# Patient Record
Sex: FEMALE | Race: WHITE | NOT HISPANIC OR LATINO | Employment: OTHER | URBAN - METROPOLITAN AREA
[De-identification: names, ages, dates, MRNs, and addresses within clinical notes are randomized per-mention and may not be internally consistent; named-entity substitution may affect disease eponyms.]

---

## 2018-10-18 ENCOUNTER — HOSPITAL ENCOUNTER (EMERGENCY)
Facility: HOSPITAL | Age: 78
Discharge: HOME | End: 2018-10-18
Attending: EMERGENCY MEDICINE
Payer: MEDICARE

## 2018-10-18 VITALS
SYSTOLIC BLOOD PRESSURE: 186 MMHG | BODY MASS INDEX: 17.13 KG/M2 | HEART RATE: 90 BPM | HEIGHT: 64 IN | WEIGHT: 100.31 LBS | DIASTOLIC BLOOD PRESSURE: 87 MMHG | TEMPERATURE: 99 F | RESPIRATION RATE: 20 BRPM | OXYGEN SATURATION: 100 %

## 2018-10-18 DIAGNOSIS — L23.9 ALLERGIC CONTACT DERMATITIS, UNSPECIFIED TRIGGER: Primary | ICD-10-CM

## 2018-10-18 DIAGNOSIS — Z72.0 TOBACCO USE: ICD-10-CM

## 2018-10-18 DIAGNOSIS — R03.0 ELEVATED BLOOD PRESSURE READING: ICD-10-CM

## 2018-10-18 PROCEDURE — 99283 EMERGENCY DEPT VISIT LOW MDM: CPT

## 2018-10-18 PROCEDURE — 25000003 PHARM REV CODE 250: Performed by: PHYSICIAN ASSISTANT

## 2018-10-18 RX ORDER — DIPHENHYDRAMINE HCL 25 MG
25 CAPSULE ORAL
Status: COMPLETED | OUTPATIENT
Start: 2018-10-18 | End: 2018-10-18

## 2018-10-18 RX ORDER — FAMOTIDINE 20 MG/1
20 TABLET, FILM COATED ORAL
Status: COMPLETED | OUTPATIENT
Start: 2018-10-18 | End: 2018-10-18

## 2018-10-18 RX ORDER — HYDROXYZINE HYDROCHLORIDE 50 MG/1
50 TABLET, FILM COATED ORAL 3 TIMES DAILY PRN
Qty: 15 TABLET | Refills: 0 | Status: SHIPPED | OUTPATIENT
Start: 2018-10-18

## 2018-10-18 RX ADMIN — DIPHENHYDRAMINE HYDROCHLORIDE 25 MG: 25 CAPSULE ORAL at 05:10

## 2018-10-18 RX ADMIN — FAMOTIDINE 20 MG: 20 TABLET ORAL at 05:10

## 2018-10-18 NOTE — ED NOTES
Left cheek with redness, itching since yest and today swelling to left cheek and redness extending to neck area. Aaox3, skin warm and dry, resp unlabored and even. amb with steady gait and burnett well.

## 2018-10-18 NOTE — ED PROVIDER NOTES
Encounter Date: 10/18/2018       History     Chief Complaint   Patient presents with    Rash     left cheek started with itching yest and today swollen  and red and redness extending to neck     The patient presents to the ER for an emergent evaluation due to a suspected allergic reaction. She states that she is having redness, swelling, and itching to her face and neck. She states that the symptoms began within the past 24 hours. She states that the degree is moderate. She states that the course is constant. She states that she has uncontrolled itching to the areas. She states that it may be related to her makeup. She denies any swelling of her face, lips, tongue, or throat. She has tried using Gold bond cream and alcohol wipes, but denies any improvement.           Review of patient's allergies indicates:  No Known Allergies  History reviewed. No pertinent past medical history.  History reviewed. No pertinent surgical history.  History reviewed. No pertinent family history.  Social History     Tobacco Use    Smoking status: Current Every Day Smoker     Packs/day: 1.00     Types: Cigarettes    Smokeless tobacco: Never Used   Substance Use Topics    Alcohol use: Yes     Comment: socially    Drug use: No     Review of Systems   Constitutional: Negative for activity change, appetite change, chills, diaphoresis, fatigue and fever.   HENT: Positive for facial swelling. Negative for sore throat, trouble swallowing and voice change.    Eyes: Negative for discharge, redness, itching and visual disturbance.   Respiratory: Negative for cough, chest tightness, shortness of breath, wheezing and stridor.    Cardiovascular: Negative for chest pain.   Gastrointestinal: Negative for abdominal pain, diarrhea, nausea and vomiting.   Genitourinary: Negative for decreased urine volume and dysuria.   Musculoskeletal: Negative for arthralgias and myalgias.   Neurological: Negative for dizziness, syncope, facial asymmetry, weakness,  light-headedness, numbness and headaches.   Hematological: Negative for adenopathy.   Psychiatric/Behavioral: Negative for confusion. The patient is nervous/anxious.        Physical Exam     Initial Vitals [10/18/18 1709]   BP Pulse Resp Temp SpO2   (!) 180/82 (!) 118 20 99.3 °F (37.4 °C) 100 %      MAP       --         Physical Exam    Nursing note and vitals reviewed.  Constitutional: She appears well-developed and well-nourished. She is not diaphoretic.   Alert and ambulatory. Accompanied by her daughter. Scratching her neck and face throughout entire interview and exam.    HENT:   Head: Normocephalic.   Mouth/Throat: Oropharynx is clear and moist.   No swelling of lips, tongue, or throat. No angioedema. No hoarseness or muffled voice. Patent airway. No adenopathy.    Eyes: Conjunctivae are normal. Right eye exhibits no discharge. Left eye exhibits no discharge.   Neck: Neck supple.   Cardiovascular: Intact distal pulses.   Pulmonary/Chest: Breath sounds normal. No respiratory distress. She has no wheezes.   Abdominal: Soft. There is no tenderness.   Musculoskeletal: Normal range of motion. She exhibits no edema.   Neurological: She is alert and oriented to person, place, and time. She has normal strength. No cranial nerve deficit.   Skin: Skin is warm and dry. Rash noted.   Psychiatric:   Anxious.          ED Course   Procedures  Labs Reviewed - No data to display       Imaging Results    None          Medical Decision Making:   Initial Assessment:   Redness and itching to face and neck after application of makeup.   ED Management:  Advised to remove all makeup or cosmetics from skin using soap and water and avoiding any further use until symptoms resolve.   Benadryl and Pepcid given in the ER   Rx for Hydroxyzine given to take at home.   Informed the patient that her blood pressure reading was significantly elevated during her ER visit today and advised her to follow up closely with primary care to be properly  evaluated for possible HTN or pre-HTN.   Advised smoking cessation   Advised prompt return to the ER if unimproved or if worse in any way.   Discussed use of steroids and pt elected to hold off on steroid therapy for now, will return if not improving.     Additional MDM:   Smoking Cessation: The patient is a smoker. The patient was counseled on smoking cessation for: 4 minutes. The patient was counseled on tobacco related  health complications. Appropriate patient literature was given to the patient concerning tobacco cessation.                    Clinical Impression:   The primary encounter diagnosis was Allergic contact dermatitis, unspecified trigger. Diagnoses of Elevated blood pressure reading and Tobacco use were also pertinent to this visit.      Disposition:   Disposition: Discharged  Condition: Stable                        Terry Escalante PA-C  10/18/18 0224

## 2022-05-28 ENCOUNTER — ANESTHESIA EVENT (OUTPATIENT)
Dept: ENDOSCOPY | Facility: HOSPITAL | Age: 82
DRG: 435 | End: 2022-05-28
Payer: MEDICARE

## 2022-05-28 ENCOUNTER — HOSPITAL ENCOUNTER (INPATIENT)
Facility: HOSPITAL | Age: 82
LOS: 1 days | Discharge: HOME | DRG: 435 | End: 2022-05-30
Attending: EMERGENCY MEDICINE | Admitting: INTERNAL MEDICINE
Payer: MEDICARE

## 2022-05-28 DIAGNOSIS — K86.89 PANCREATIC MASS: Primary | ICD-10-CM

## 2022-05-28 DIAGNOSIS — Z72.0 TOBACCO ABUSE: ICD-10-CM

## 2022-05-28 DIAGNOSIS — R17 JAUNDICE: ICD-10-CM

## 2022-05-28 DIAGNOSIS — R07.9 CHEST PAIN: ICD-10-CM

## 2022-05-28 DIAGNOSIS — R53.1 WEAKNESS: ICD-10-CM

## 2022-05-28 DIAGNOSIS — R63.4 WEIGHT LOSS: ICD-10-CM

## 2022-05-28 DIAGNOSIS — R19.00 ABDOMINAL MASS, UNSPECIFIED ABDOMINAL LOCATION: ICD-10-CM

## 2022-05-28 PROBLEM — R93.89 ABNORMAL CXR: Status: ACTIVE | Noted: 2022-05-28

## 2022-05-28 PROBLEM — F17.200 TOBACCO USE DISORDER: Status: ACTIVE | Noted: 2022-05-28

## 2022-05-28 PROBLEM — K83.1 OBSTRUCTION OF BILE DUCT: Status: ACTIVE | Noted: 2022-05-28

## 2022-05-28 PROBLEM — I10 HTN (HYPERTENSION): Status: ACTIVE | Noted: 2022-05-28

## 2022-05-28 PROBLEM — R74.8 ELEVATED LIVER ENZYMES: Status: ACTIVE | Noted: 2022-05-28

## 2022-05-28 LAB
ALBUMIN SERPL BCP-MCNC: 2.7 G/DL (ref 3.5–5.2)
ALP SERPL-CCNC: 1705 U/L (ref 55–135)
ALT SERPL W/O P-5'-P-CCNC: 396 U/L (ref 10–44)
AMMONIA PLAS-SCNC: 26 UMOL/L (ref 10–50)
AMYLASE SERPL-CCNC: 213 U/L (ref 20–110)
ANION GAP SERPL CALC-SCNC: 13 MMOL/L (ref 8–16)
APTT BLDCRRT: 27.3 SEC (ref 21–32)
AST SERPL-CCNC: 333 U/L (ref 10–40)
BACTERIA #/AREA URNS AUTO: NORMAL /HPF
BASOPHILS # BLD AUTO: 0.05 K/UL (ref 0–0.2)
BASOPHILS NFR BLD: 0.6 % (ref 0–1.9)
BILIRUB DIRECT SERPL-MCNC: >14 MG/DL (ref 0.1–0.3)
BILIRUB SERPL-MCNC: 23.2 MG/DL (ref 0.1–1)
BILIRUB UR QL STRIP: ABNORMAL
BUN SERPL-MCNC: 13 MG/DL (ref 8–23)
CALCIUM SERPL-MCNC: 9.7 MG/DL (ref 8.7–10.5)
CHLORIDE SERPL-SCNC: 97 MMOL/L (ref 95–110)
CLARITY UR REFRACT.AUTO: ABNORMAL
CO2 SERPL-SCNC: 24 MMOL/L (ref 23–29)
COLOR UR AUTO: ABNORMAL
CREAT SERPL-MCNC: 0.6 MG/DL (ref 0.5–1.4)
CTP QC/QA: YES
DIFFERENTIAL METHOD: ABNORMAL
EOSINOPHIL # BLD AUTO: 0.1 K/UL (ref 0–0.5)
EOSINOPHIL NFR BLD: 0.9 % (ref 0–8)
ERYTHROCYTE [DISTWIDTH] IN BLOOD BY AUTOMATED COUNT: 17 % (ref 11.5–14.5)
EST. GFR  (AFRICAN AMERICAN): >60 ML/MIN/1.73 M^2
EST. GFR  (NON AFRICAN AMERICAN): >60 ML/MIN/1.73 M^2
ETHANOL SERPL-MCNC: <10 MG/DL
GLUCOSE SERPL-MCNC: 99 MG/DL (ref 70–110)
GLUCOSE UR QL STRIP: NEGATIVE
HCT VFR BLD AUTO: 36.2 % (ref 37–48.5)
HGB BLD-MCNC: 12.9 G/DL (ref 12–16)
HGB UR QL STRIP: NEGATIVE
IMM GRANULOCYTES # BLD AUTO: 0.06 K/UL (ref 0–0.04)
IMM GRANULOCYTES NFR BLD AUTO: 0.7 % (ref 0–0.5)
INR PPP: 1.1 (ref 0.8–1.2)
INR PPP: 1.1 (ref 0.8–1.2)
KETONES UR QL STRIP: NEGATIVE
LEUKOCYTE ESTERASE UR QL STRIP: NEGATIVE
LIPASE SERPL-CCNC: 230 U/L (ref 4–60)
LIPASE SERPL-CCNC: 496 U/L (ref 4–60)
LYMPHOCYTES # BLD AUTO: 1.1 K/UL (ref 1–4.8)
LYMPHOCYTES NFR BLD: 12.9 % (ref 18–48)
MAGNESIUM SERPL-MCNC: 2 MG/DL (ref 1.6–2.6)
MCH RBC QN AUTO: 31.1 PG (ref 27–31)
MCHC RBC AUTO-ENTMCNC: 35.6 G/DL (ref 32–36)
MCV RBC AUTO: 87 FL (ref 82–98)
MICROSCOPIC COMMENT: NORMAL
MONOCYTES # BLD AUTO: 0.6 K/UL (ref 0.3–1)
MONOCYTES NFR BLD: 6.8 % (ref 4–15)
NEUTROPHILS # BLD AUTO: 6.7 K/UL (ref 1.8–7.7)
NEUTROPHILS NFR BLD: 78.1 % (ref 38–73)
NITRITE UR QL STRIP: NEGATIVE
NRBC BLD-RTO: 0 /100 WBC
PH UR STRIP: 6 [PH] (ref 5–8)
PLATELET # BLD AUTO: 349 K/UL (ref 150–450)
PMV BLD AUTO: 12.6 FL (ref 9.2–12.9)
POCT GLUCOSE: 100 MG/DL (ref 70–110)
POTASSIUM SERPL-SCNC: 4.1 MMOL/L (ref 3.5–5.1)
PROCALCITONIN SERPL IA-MCNC: 0.23 NG/ML
PROT SERPL-MCNC: 7.5 G/DL (ref 6–8.4)
PROT UR QL STRIP: NEGATIVE
PROTHROMBIN TIME: 11.3 SEC (ref 9–12.5)
PROTHROMBIN TIME: 11.5 SEC (ref 9–12.5)
RBC # BLD AUTO: 4.15 M/UL (ref 4–5.4)
RBC #/AREA URNS AUTO: 0 /HPF (ref 0–4)
SARS-COV-2 RDRP RESP QL NAA+PROBE: NEGATIVE
SODIUM SERPL-SCNC: 134 MMOL/L (ref 136–145)
SP GR UR STRIP: 1.02 (ref 1–1.03)
SQUAMOUS #/AREA URNS AUTO: 1 /HPF
URN SPEC COLLECT METH UR: ABNORMAL
UROBILINOGEN UR STRIP-ACNC: NEGATIVE EU/DL
WBC # BLD AUTO: 8.52 K/UL (ref 3.9–12.7)
WBC #/AREA URNS AUTO: 2 /HPF (ref 0–5)

## 2022-05-28 PROCEDURE — 85610 PROTHROMBIN TIME: CPT | Mod: 91 | Performed by: FAMILY MEDICINE

## 2022-05-28 PROCEDURE — 83735 ASSAY OF MAGNESIUM: CPT | Mod: ER | Performed by: EMERGENCY MEDICINE

## 2022-05-28 PROCEDURE — G0378 HOSPITAL OBSERVATION PER HR: HCPCS | Mod: ER

## 2022-05-28 PROCEDURE — 82962 GLUCOSE BLOOD TEST: CPT | Mod: ER

## 2022-05-28 PROCEDURE — 86301 IMMUNOASSAY TUMOR CA 19-9: CPT | Performed by: EMERGENCY MEDICINE

## 2022-05-28 PROCEDURE — 63600175 PHARM REV CODE 636 W HCPCS: Performed by: FAMILY MEDICINE

## 2022-05-28 PROCEDURE — A4216 STERILE WATER/SALINE, 10 ML: HCPCS | Performed by: FAMILY MEDICINE

## 2022-05-28 PROCEDURE — 83690 ASSAY OF LIPASE: CPT | Mod: 91 | Performed by: FAMILY MEDICINE

## 2022-05-28 PROCEDURE — 85025 COMPLETE CBC W/AUTO DIFF WBC: CPT | Mod: ER | Performed by: EMERGENCY MEDICINE

## 2022-05-28 PROCEDURE — U0002 COVID-19 LAB TEST NON-CDC: HCPCS | Mod: ER | Performed by: EMERGENCY MEDICINE

## 2022-05-28 PROCEDURE — 82150 ASSAY OF AMYLASE: CPT | Mod: ER | Performed by: EMERGENCY MEDICINE

## 2022-05-28 PROCEDURE — 80053 COMPREHEN METABOLIC PANEL: CPT | Mod: ER | Performed by: EMERGENCY MEDICINE

## 2022-05-28 PROCEDURE — 99285 EMERGENCY DEPT VISIT HI MDM: CPT | Mod: 25,ER

## 2022-05-28 PROCEDURE — 85610 PROTHROMBIN TIME: CPT | Mod: ER | Performed by: EMERGENCY MEDICINE

## 2022-05-28 PROCEDURE — 93005 ELECTROCARDIOGRAM TRACING: CPT | Mod: ER

## 2022-05-28 PROCEDURE — 82140 ASSAY OF AMMONIA: CPT | Mod: ER | Performed by: EMERGENCY MEDICINE

## 2022-05-28 PROCEDURE — 96372 THER/PROPH/DIAG INJ SC/IM: CPT | Performed by: FAMILY MEDICINE

## 2022-05-28 PROCEDURE — 93010 ELECTROCARDIOGRAM REPORT: CPT | Mod: ,,, | Performed by: INTERNAL MEDICINE

## 2022-05-28 PROCEDURE — G0378 HOSPITAL OBSERVATION PER HR: HCPCS

## 2022-05-28 PROCEDURE — 96361 HYDRATE IV INFUSION ADD-ON: CPT | Mod: ER

## 2022-05-28 PROCEDURE — 25000003 PHARM REV CODE 250: Performed by: FAMILY MEDICINE

## 2022-05-28 PROCEDURE — 82248 BILIRUBIN DIRECT: CPT | Performed by: FAMILY MEDICINE

## 2022-05-28 PROCEDURE — 36415 COLL VENOUS BLD VENIPUNCTURE: CPT | Performed by: FAMILY MEDICINE

## 2022-05-28 PROCEDURE — 81000 URINALYSIS NONAUTO W/SCOPE: CPT | Mod: ER | Performed by: EMERGENCY MEDICINE

## 2022-05-28 PROCEDURE — 96375 TX/PRO/DX INJ NEW DRUG ADDON: CPT | Performed by: EMERGENCY MEDICINE

## 2022-05-28 PROCEDURE — 87040 BLOOD CULTURE FOR BACTERIA: CPT | Mod: 59 | Performed by: FAMILY MEDICINE

## 2022-05-28 PROCEDURE — 83690 ASSAY OF LIPASE: CPT | Mod: ER | Performed by: EMERGENCY MEDICINE

## 2022-05-28 PROCEDURE — 84145 PROCALCITONIN (PCT): CPT | Performed by: FAMILY MEDICINE

## 2022-05-28 PROCEDURE — 63600175 PHARM REV CODE 636 W HCPCS: Mod: ER | Performed by: EMERGENCY MEDICINE

## 2022-05-28 PROCEDURE — 82077 ASSAY SPEC XCP UR&BREATH IA: CPT | Mod: ER | Performed by: EMERGENCY MEDICINE

## 2022-05-28 PROCEDURE — 93010 EKG 12-LEAD: ICD-10-PCS | Mod: ,,, | Performed by: INTERNAL MEDICINE

## 2022-05-28 PROCEDURE — 80074 ACUTE HEPATITIS PANEL: CPT | Performed by: EMERGENCY MEDICINE

## 2022-05-28 PROCEDURE — 85730 THROMBOPLASTIN TIME PARTIAL: CPT | Mod: ER | Performed by: EMERGENCY MEDICINE

## 2022-05-28 RX ORDER — SODIUM CHLORIDE 0.9 % (FLUSH) 0.9 %
10 SYRINGE (ML) INJECTION EVERY 8 HOURS
Status: DISCONTINUED | OUTPATIENT
Start: 2022-05-28 | End: 2022-05-30 | Stop reason: HOSPADM

## 2022-05-28 RX ORDER — KETOROLAC TROMETHAMINE 30 MG/ML
15 INJECTION, SOLUTION INTRAMUSCULAR; INTRAVENOUS ONCE AS NEEDED
Status: DISCONTINUED | OUTPATIENT
Start: 2022-05-28 | End: 2022-05-30 | Stop reason: HOSPADM

## 2022-05-28 RX ORDER — HYDROXYZINE HYDROCHLORIDE 25 MG/1
50 TABLET, FILM COATED ORAL 3 TIMES DAILY PRN
Status: DISCONTINUED | OUTPATIENT
Start: 2022-05-28 | End: 2022-05-30 | Stop reason: HOSPADM

## 2022-05-28 RX ORDER — POLYETHYLENE GLYCOL 3350 17 G/17G
17 POWDER, FOR SOLUTION ORAL DAILY
Status: DISCONTINUED | OUTPATIENT
Start: 2022-05-28 | End: 2022-05-30 | Stop reason: HOSPADM

## 2022-05-28 RX ORDER — TALC
6 POWDER (GRAM) TOPICAL NIGHTLY PRN
Status: DISCONTINUED | OUTPATIENT
Start: 2022-05-28 | End: 2022-05-30 | Stop reason: HOSPADM

## 2022-05-28 RX ORDER — SODIUM,POTASSIUM PHOSPHATES 280-250MG
2 POWDER IN PACKET (EA) ORAL
Status: DISCONTINUED | OUTPATIENT
Start: 2022-05-28 | End: 2022-05-30 | Stop reason: HOSPADM

## 2022-05-28 RX ORDER — IBUPROFEN 400 MG/1
400 TABLET ORAL EVERY 6 HOURS PRN
Status: DISCONTINUED | OUTPATIENT
Start: 2022-05-28 | End: 2022-05-30 | Stop reason: HOSPADM

## 2022-05-28 RX ORDER — GLUCAGON 1 MG
1 KIT INJECTION
Status: DISCONTINUED | OUTPATIENT
Start: 2022-05-28 | End: 2022-05-30 | Stop reason: HOSPADM

## 2022-05-28 RX ORDER — INSULIN ASPART 100 [IU]/ML
0-5 INJECTION, SOLUTION INTRAVENOUS; SUBCUTANEOUS
Status: DISCONTINUED | OUTPATIENT
Start: 2022-05-28 | End: 2022-05-30 | Stop reason: HOSPADM

## 2022-05-28 RX ORDER — METOPROLOL SUCCINATE 25 MG/1
TABLET, EXTENDED RELEASE ORAL
Status: ON HOLD | COMMUNITY
Start: 2021-08-26 | End: 2022-05-30 | Stop reason: HOSPADM

## 2022-05-28 RX ORDER — ACETAMINOPHEN 325 MG/1
650 TABLET ORAL EVERY 4 HOURS PRN
Status: DISCONTINUED | OUTPATIENT
Start: 2022-05-28 | End: 2022-05-28

## 2022-05-28 RX ORDER — HYDRALAZINE HYDROCHLORIDE 20 MG/ML
10 INJECTION INTRAMUSCULAR; INTRAVENOUS EVERY 8 HOURS PRN
Status: DISCONTINUED | OUTPATIENT
Start: 2022-05-28 | End: 2022-05-30 | Stop reason: HOSPADM

## 2022-05-28 RX ORDER — NALOXONE HCL 0.4 MG/ML
0.02 VIAL (ML) INJECTION
Status: DISCONTINUED | OUTPATIENT
Start: 2022-05-28 | End: 2022-05-30 | Stop reason: HOSPADM

## 2022-05-28 RX ORDER — LANOLIN ALCOHOL/MO/W.PET/CERES
800 CREAM (GRAM) TOPICAL
Status: DISCONTINUED | OUTPATIENT
Start: 2022-05-28 | End: 2022-05-30 | Stop reason: HOSPADM

## 2022-05-28 RX ORDER — NICOTINE 7MG/24HR
1 PATCH, TRANSDERMAL 24 HOURS TRANSDERMAL DAILY
Status: DISCONTINUED | OUTPATIENT
Start: 2022-05-29 | End: 2022-05-30 | Stop reason: HOSPADM

## 2022-05-28 RX ORDER — ACETAMINOPHEN 325 MG/1
650 TABLET ORAL EVERY 4 HOURS PRN
Status: DISCONTINUED | OUTPATIENT
Start: 2022-05-28 | End: 2022-05-30 | Stop reason: HOSPADM

## 2022-05-28 RX ORDER — LABETALOL HYDROCHLORIDE 5 MG/ML
10 INJECTION, SOLUTION INTRAVENOUS ONCE
Status: COMPLETED | OUTPATIENT
Start: 2022-05-29 | End: 2022-05-29

## 2022-05-28 RX ORDER — HEPARIN SODIUM 5000 [USP'U]/ML
5000 INJECTION, SOLUTION INTRAVENOUS; SUBCUTANEOUS EVERY 8 HOURS
Status: DISCONTINUED | OUTPATIENT
Start: 2022-05-28 | End: 2022-05-30 | Stop reason: HOSPADM

## 2022-05-28 RX ORDER — SODIUM CHLORIDE 0.9 % (FLUSH) 0.9 %
10 SYRINGE (ML) INJECTION
Status: DISCONTINUED | OUTPATIENT
Start: 2022-05-28 | End: 2022-05-30 | Stop reason: HOSPADM

## 2022-05-28 RX ORDER — IBUPROFEN 200 MG
16 TABLET ORAL
Status: DISCONTINUED | OUTPATIENT
Start: 2022-05-28 | End: 2022-05-30 | Stop reason: HOSPADM

## 2022-05-28 RX ORDER — IBUPROFEN 200 MG
24 TABLET ORAL
Status: DISCONTINUED | OUTPATIENT
Start: 2022-05-28 | End: 2022-05-30 | Stop reason: HOSPADM

## 2022-05-28 RX ORDER — SODIUM CHLORIDE, SODIUM LACTATE, POTASSIUM CHLORIDE, CALCIUM CHLORIDE 600; 310; 30; 20 MG/100ML; MG/100ML; MG/100ML; MG/100ML
1000 INJECTION, SOLUTION INTRAVENOUS
Status: COMPLETED | OUTPATIENT
Start: 2022-05-28 | End: 2022-05-28

## 2022-05-28 RX ADMIN — POLYETHYLENE GLYCOL 3350 17 G: 17 POWDER, FOR SOLUTION ORAL at 04:05

## 2022-05-28 RX ADMIN — Medication 10 ML: at 09:05

## 2022-05-28 RX ADMIN — SODIUM CHLORIDE, SODIUM LACTATE, POTASSIUM CHLORIDE, AND CALCIUM CHLORIDE 1000 ML: .6; .31; .03; .02 INJECTION, SOLUTION INTRAVENOUS at 10:05

## 2022-05-28 RX ADMIN — HYDRALAZINE HYDROCHLORIDE 10 MG: 20 INJECTION, SOLUTION INTRAMUSCULAR; INTRAVENOUS at 04:05

## 2022-05-28 RX ADMIN — HEPARIN SODIUM 5000 UNITS: 5000 INJECTION INTRAVENOUS; SUBCUTANEOUS at 09:05

## 2022-05-28 NOTE — ASSESSMENT & PLAN NOTE
Concerns for mass involving pancreatic head  Gastroenterology consulted  Npo after mn for eus and ercp  Ca19-9

## 2022-05-28 NOTE — ASSESSMENT & PLAN NOTE
Increased density right lung base  Ct chest with emphysema but no suspicious masses   Tobacco use disorder

## 2022-05-28 NOTE — PLAN OF CARE
[facility]     GI was consulted for new onset jaundice. Patient is at [facility] ED. Labs notable for a TB: 23.2, alk phos of 1705, , . CTAP revealed marked dilation of bile ducts and pancreatic ducts.     Case was discussed with ED physician and Dr Christy.   Plan for ERCP and EUS tomorrow. NPO after MN. Hold DVT prophylaxis.  ordered.

## 2022-05-28 NOTE — ASSESSMENT & PLAN NOTE
Intrahepatic and extrahepatic ductal dilatation on imaging  No focal liver masses  eus and ercp in am per gastroenterology   Avoid liver toxins  etoh serum within normal limits

## 2022-05-28 NOTE — ED PROVIDER NOTES
Encounter Date: 5/28/2022       History     Chief Complaint   Patient presents with    Jaundice     Onset 1 week. Denies liver problems. Jaundice skin and eyes. +itchy skin       Here with her daughter who is a nurse.  She has had relatively little medical history and relatively infrequent medical contact, last known checkup or labs were about a year ago.  She does have high blood pressure for which she takes metoprolol as her only medication.  She drinks on occasion, no reported history of heavy alcohol use.  Smokes a pack a day.  Symptoms now include weight loss of about 10% of her previous body weight over an uncertain period of time, decreased appetite, increase in chronic skin problems in the form of itching and now noted by patient and family to be jaundiced and icteric for about a week.  When further questioned about alcohol use, it is unclear but it appears that she drinks 2 or more drinks a day.  Denies chest pain, fever, abdominal pain, any sign of bleeding, dyspnea, urinary complaints, or any other problems.  No previous known history of liver disease or any other condition or surgery besides cataract excision.  She appears to have delayed seeking medical care due to a general discomfort with healthcare issues.    The history is provided by the patient and a relative. No  was used.     Review of patient's allergies indicates:  No Known Allergies  Past Medical History:   Diagnosis Date    Hypertension      Past Surgical History:   Procedure Laterality Date    CATARACT EXTRACTION  2021     History reviewed. No pertinent family history.  Social History     Tobacco Use    Smoking status: Current Every Day Smoker     Packs/day: 1.00     Types: Cigarettes    Smokeless tobacco: Never Used   Substance Use Topics    Alcohol use: Yes     Alcohol/week: 14.0 standard drinks     Types: 14 Cans of beer per week     Comment: socially    Drug use: No     Review of Systems   Constitutional:  Positive for appetite change and unexpected weight change. Negative for activity change, fatigue and fever.   HENT: Negative for congestion, ear pain, facial swelling, nosebleeds, sinus pressure and sore throat.    Eyes: Negative for pain, discharge, redness and visual disturbance.   Respiratory: Negative for cough, choking, chest tightness, shortness of breath and wheezing.    Cardiovascular: Negative for chest pain, palpitations and leg swelling.   Gastrointestinal: Negative for abdominal distention, abdominal pain, nausea and vomiting.   Endocrine: Negative for heat intolerance, polydipsia and polyuria.   Genitourinary: Negative for difficulty urinating, dysuria, flank pain, hematuria and urgency.   Musculoskeletal: Negative for back pain, gait problem, joint swelling and myalgias.   Skin: Positive for color change. Negative for rash.   Allergic/Immunologic: Negative for environmental allergies and food allergies.   Neurological: Negative for dizziness, weakness, numbness and headaches.   Hematological: Negative for adenopathy. Does not bruise/bleed easily.   Psychiatric/Behavioral: Negative for agitation and behavioral problems. The patient is not nervous/anxious.    All other systems reviewed and are negative.      Physical Exam     Initial Vitals [05/28/22 0705]   BP Pulse Resp Temp SpO2   (!) 200/116 101 18 97.7 °F (36.5 °C) 97 %      MAP       --         Physical Exam    Nursing note and vitals reviewed.  Constitutional: She appears well-developed. She is not diaphoretic. No distress.   Underweight; general muscular wasting; NAD   HENT:   Head: Normocephalic and atraumatic.   Mouth/Throat: No oropharyngeal exudate.   Eyes: Conjunctivae and EOM are normal. Pupils are equal, round, and reactive to light. Right eye exhibits no discharge. Left eye exhibits no discharge. Scleral icterus is present.   Neck: Neck supple. No thyromegaly present. No tracheal deviation present. No JVD present.   Normal range of  motion.  Cardiovascular: Normal rate, regular rhythm, normal heart sounds and intact distal pulses. Exam reveals no gallop and no friction rub.    No murmur heard.  Pulmonary/Chest: Breath sounds normal. No stridor. No respiratory distress. She has no wheezes. She has no rhonchi. She has no rales. She exhibits no tenderness.   Abdominal: Abdomen is soft. Bowel sounds are normal. She exhibits mass. She exhibits no distension. There is no abdominal tenderness.   Scaphoid; firm, enlarged, irregular left upper and upper mid abdominal mass or organomegaly palpable, nontender. There is no rebound and no guarding.   Musculoskeletal:         General: No tenderness or edema. Normal range of motion.      Cervical back: Normal range of motion and neck supple.     Neurological: She is alert and oriented to person, place, and time. She has normal strength.   Skin: Skin is warm and dry. No rash and no abscess noted. No erythema.   Jaundice with scattered moderate telangiectasias and scattered moderate signs of excoriation and mild eschar consistent with frequent and chronic scratching.  No sign of secondary infection.   Psychiatric: She has a normal mood and affect. Her behavior is normal. Judgment and thought content normal.   Competent, no acute or diagnostic findings, but appears to have denial about her physical circumstance.         ED Course   Procedures  Labs Reviewed   CBC W/ AUTO DIFFERENTIAL - Abnormal; Notable for the following components:       Result Value    Hematocrit 36.2 (*)     MCH 31.1 (*)     RDW 17.0 (*)     Immature Granulocytes 0.7 (*)     Immature Grans (Abs) 0.06 (*)     Gran % 78.1 (*)     Lymph % 12.9 (*)     All other components within normal limits   COMPREHENSIVE METABOLIC PANEL - Abnormal; Notable for the following components:    Sodium 134 (*)     Albumin 2.7 (*)     Total Bilirubin 23.2 (*)     Alkaline Phosphatase 1,705 (*)      (*)      (*)     All other components within normal  limits   LIPASE - Abnormal; Notable for the following components:    Lipase 230 (*)     All other components within normal limits   AMYLASE - Abnormal; Notable for the following components:    Amylase 213 (*)     All other components within normal limits   AMMONIA   MAGNESIUM   PROTIME-INR   APTT   ALCOHOL,MEDICAL (ETHANOL)   URINALYSIS, REFLEX TO URINE CULTURE   HEPATITIS PANEL, ACUTE   CANCER ANTIGEN 19-9   SARS-COV-2 RDRP GENE   POCT GLUCOSE   POCT GLUCOSE MONITORING CONTINUOUS     EKG Readings: (Independently Interpreted)   Initial Reading: No STEMI. Rhythm: Normal Sinus Rhythm. Heart Rate: 95. Ectopy: No Ectopy.   Sinus rhythm with LVH, lateral and anterior ST and T-wave changes likely related to LVH, consider ischemia.  No ST elevation.       Imaging Results          CT Chest Abdomen Pelvis Without Contrast (XPD) (Final result)  Result time 05/28/22 08:30:03    Final result by Yesika Nieves MD (Timothy) (05/28/22 08:30:03)                 Impression:      Marked dilatation of the bile ducts and pancreatic ducts.  Obstruction of the distal common bile duct is suspected.  Etiology of obstruction is is indeterminate from this evaluation.  Further evaluation for obstructing stone occult mass involving the pancreatic head and uncinate process must be performed.  Consider ultrasound and contrast CT of the abdomen and pelvis and or MRI.    All CT scans at this facility use dose modulation, iterative reconstructions, and/or weight base dosing when appropriate to reduce radiation dose to as low as reasonably achievable      Electronically signed by: Yesika Nieves MD  Date:    05/28/2022  Time:    08:30             Narrative:    EXAMINATION:  CT CHEST ABDOMEN PELVIS WITHOUT CONTRAST(XPD)    CLINICAL HISTORY:  New onset jaundice suspect malignancy;    COMPARISON:  None    FINDINGS:  Chest: Moderate emphysema is present.  No focal lung infiltrates.  No consolidation.  No suspicious masses.  No pleural effusions.   Heart is borderline enlarged.  No pericardial effusion.    Skeletal structures are intact.    Abdomen pelvis:    No definite focal liver masses.  However there is marked intrahepatic as well as extrahepatic bile duct dilatation.  Common bile duct measures 1.2 cm.  There also appears to be dilatation of the pancreatic duct measuring 1.8 cm.  The gallbladder is distended as well.  No focal abnormalities in the spleen.  Pancreas is difficult to evaluate for mass without IV or oral contrast.  Occult mass involving the pancreatic head and uncinate process cannot be excluded given bile duct and pancreatic ductal dilatation.    Right kidney appears unremarkable.  Small atrophic left kidney with a small 1.5 cm renal cyst.  Fusiform aneurysm of the abdominal aorta diameter of 4.4 x 3.6 cm no retroperitoneal hematoma.    There are no acute bowel abnormalities.     No evidence of appendicitis.  No evidence of diverticulitis.    Bladder is normal.Small amount of free fluid in the pelvis.    Skeletal structures are intact.  No acute skeletal findings.                               X-Ray Chest PA And Lateral (Final result)  Result time 05/28/22 07:44:07    Final result by Bryn Lindsay MD (05/28/22 07:44:07)                 Impression:      1.  Increased density in the medial right lung base concerning for possible infiltrate.  If the patient is having symptoms of pneumonia, then treatment for presumed pneumonia and follow-up x-rays 4-6 weeks recommended.  If the patient is not having symptoms of pneumonia, however, further evaluation with a chest CT scan may be helpful.  Other any old studies ectomy made available for comparison?    2.  Incidental findings as noted above.  Negative for acute process otherwise.      Electronically signed by: Bryn Lindsay MD  Date:    05/28/2022  Time:    07:44             Narrative:    EXAMINATION:  XR CHEST PA AND LATERAL    CLINICAL HISTORY:  Chest Pain;    COMPARISON:  No comparison studies  are available.    FINDINGS:  EKG leads overlie the chest which is rotated to the left.  Hyperinflation.  There is an area of increased density in the medial right lung base.  The lungs are otherwise clear.  The cardiac silhouette size is normal. The trachea is midline and the mediastinal width is normal. Negative for focal infiltrate, effusion or pneumothorax. Pulmonary vasculature is normal. Negative for osseous abnormalities. Hiatal hernia.  Ectatic and tortuous aorta with aortic arch calcifications.  Degenerative changes of the spine and both shoulder girdles.                                10:03 AM Discussed with hepatology and Gastroenterology on-call, recommended admit, NPO tonight for ERCP and endoscopic ultrasound in the morning. Discussed with Hospital Medicine re: admission.    10:36 AM Counseled in detail regarding findings so far, likely diagnosis of pancreatic cancer causing obstructive jaundice.  Admit to [facility] today for anticipated ERCP and endoscopic ultrasound in the morning.    All historical, clinical, radiographic, and laboratory findings were reviewed with the patient/family in detail along with the indications for transport to the facility in [place] in order to receive above-referenced evaluation and treatment.  All remaining questions and concerns were addressed at this time and the patient/family communicates understanding and agrees to proceed accordingly.  Similarly, all pertinent details of the encounter were discussed with Dr. Huang who agrees to receive the patient at [facility] for further care as outlined above.  The patient will be transferred by [facility] ambulance services secondary to a need for ongoing monitoring/ IV fluids en route.  Jeronimo Patrick MD  10:37 AM          Medications - No data to display                       Clinical Impression:   Final diagnoses:  [R53.1] Weakness  [R17] Jaundice (Primary)  [R63.4] Weight loss  [R19.00] Abdominal  mass, unspecified abdominal location  [Z72.0] Tobacco abuse          ED Disposition Condition    Observation               Jeronimo Patrick MD  05/28/22 1038

## 2022-05-28 NOTE — HPI
82F PMH hypertension, who presented to emergency department in [facility] by daughter who is a nurse with concerns for jaundice x 1w. Infrequently seeks medical care, last known checkup or labs were about a year ago. Symptoms now include weight loss of about 10% of her previous body weight over an uncertain period of time, decreased appetite, increase in chronic skin problems in the form of itching. Denies chest pain, dyspnea, dysphagia, fever, abdominal pain, any sign of bleeding, dyspnea, urinary complaints, or any other problems.      SH etoh (2-3 beers, socially), smoker (1ppd since 19yo); denies illicit drug use; cares for elderly  PSH cataract extraction  [facility] maternal breast cancer    Initial workup in emergency department with hypertension, cbc unremarkable, cmp with elevated alk phos, tbili, liver enzymes, amylase and lipase. Etoh serum within normal limits, urinalysis with bilirubin. Ct c/abdomen/p with duct dilatation and obstruction. Chest x-ray with increased density in right lobe.    Gastroenterology recommended hospitalization for eus and ercp tomorrow.    Hospital medicine has been consulted for admission under observation.

## 2022-05-28 NOTE — SUBJECTIVE & OBJECTIVE
Past Medical History:   Diagnosis Date    Hypertension        Past Surgical History:   Procedure Laterality Date    CATARACT EXTRACTION  2021       Review of patient's allergies indicates:  No Known Allergies    No current facility-administered medications on file prior to encounter.     Current Outpatient Medications on File Prior to Encounter   Medication Sig    hydrOXYzine (ATARAX) 50 MG tablet Take 1 tablet (50 mg total) by mouth 3 (three) times daily as needed (Allergic rash/itching).    metoprolol succinate (TOPROL-XL) 25 MG 24 hr tablet 1/2 tablet at bedtime    promethazine (PHENERGAN) 12.5 MG Tab Take 1 tablet (12.5 mg total) by mouth every 6 (six) hours as needed for Nausea.     Family History    None       Tobacco Use    Smoking status: Current Every Day Smoker     Packs/day: 1.00     Types: Cigarettes    Smokeless tobacco: Never Used   Substance and Sexual Activity    Alcohol use: Yes     Alcohol/week: 14.0 standard drinks     Types: 14 Cans of beer per week     Comment: socially    Drug use: No    Sexual activity: Not on file     Review of Systems   Constitutional:  Positive for appetite change, fatigue and unexpected weight change. Negative for activity change and fever.   HENT:  Positive for hearing loss. Negative for trouble swallowing.    Respiratory:  Negative for shortness of breath.    Cardiovascular:  Negative for chest pain.   Gastrointestinal:  Negative for abdominal distention, abdominal pain, diarrhea, nausea and vomiting.   Musculoskeletal:  Negative for gait problem.   Skin:  Positive for color change and rash.        Pruritis    Neurological:  Positive for weakness. Negative for headaches.   Psychiatric/Behavioral:  Negative for dysphoric mood. The patient is not nervous/anxious.    Objective:     Vital Signs (Most Recent):  Temp: 97 °F (36.1 °C) (05/28/22 1459)  Pulse: 90 (05/28/22 1459)  Resp: 18 (05/28/22 1459)  BP: (!) 188/98 (05/28/22 1459)  SpO2: 100 % (05/28/22 1459)   Vital Signs  (24h Range):  Temp:  [97 °F (36.1 °C)-97.7 °F (36.5 °C)] 97 °F (36.1 °C)  Pulse:  [] 90  Resp:  [18-21] 18  SpO2:  [97 %-100 %] 100 %  BP: (159-200)/() 188/98     Weight: 37.5 kg (82 lb 10.8 oz)  Body mass index is 14.19 kg/m².    Physical Exam  Vitals and nursing note reviewed. Exam conducted with a chaperone present (daughter).   Constitutional:       General: She is not in acute distress.     Appearance: She is ill-appearing. She is not toxic-appearing.   HENT:      Head: Normocephalic and atraumatic.   Eyes:      General: Scleral icterus present.   Cardiovascular:      Rate and Rhythm: Normal rate.   Pulmonary:      Effort: Pulmonary effort is normal. No respiratory distress.   Abdominal:      General: There is no distension.      Palpations: Abdomen is soft.      Tenderness: There is no abdominal tenderness.   Musculoskeletal:      Right lower leg: No edema.      Left lower leg: No edema.   Skin:     General: Skin is warm.      Coloration: Skin is jaundiced.   Neurological:      Mental Status: She is alert and oriented to person, place, and time.      Motor: No weakness.   Psychiatric:         Mood and Affect: Mood normal.         Behavior: Behavior normal.           Significant Labs: All pertinent labs within the past 24 hours have been reviewed.  Bilirubin:   Recent Labs   Lab 05/28/22 0722   BILITOT 23.2*     Blood Culture: No results for input(s): LABBLOO in the last 48 hours.  CBC:   Recent Labs   Lab 05/28/22 0722   WBC 8.52   HGB 12.9   HCT 36.2*        CMP:   Recent Labs   Lab 05/28/22 0722   *   K 4.1   CL 97   CO2 24   GLU 99   BUN 13   CREATININE 0.6   CALCIUM 9.7   PROT 7.5   ALBUMIN 2.7*   BILITOT 23.2*   ALKPHOS 1,705*   *   *   ANIONGAP 13   EGFRNONAA >60.0     Coagulation:   Recent Labs   Lab 05/28/22 0722   INR 1.1   APTT 27.3     Lipase:   Recent Labs   Lab 05/28/22 0722   LIPASE 230*       Significant Imaging: I have reviewed all pertinent imaging  results/findings within the past 24 hours.  CT: I have reviewed all pertinent results/findings within the past 24 hours and my personal findings are:  ductal dilitation and obstruction  CXR: I have reviewed all pertinent results/findings within the past 24 hours and my personal findings are:  right lobe hyperdensity

## 2022-05-28 NOTE — H&P
[facility] Medicine  History & Physical    Patient Name: Ann Marie Correa  MRN: [id number]  Patient Class: OP- Observation  Admission Date: 5/28/2022  Attending Physician: Lien Dolan MD   Primary Care Provider: Primary Doctor No         Patient information was obtained from patient, relative(s) and ER records.     Subjective:     Principal Problem:Obstruction of bile duct    Chief Complaint:   Chief Complaint   Patient presents with    Jaundice     Onset 1 week. Denies liver problems. Jaundice skin and eyes. +itchy skin          HPI: 82F PMH hypertension, who presented to emergency department in [facility] by daughter who is a nurse with concerns for jaundice x 1w. Infrequently seeks medical care, last known checkup or labs were about a year ago. Symptoms now include weight loss of about 10% of her previous body weight over an uncertain period of time, decreased appetite, increase in chronic skin problems in the form of itching. Denies chest pain, dyspnea, dysphagia, fever, abdominal pain, any sign of bleeding, dyspnea, urinary complaints, or any other problems.      SH etoh (2-3 beers, socially), smoker (1ppd since 19yo); denies illicit drug use; cares for elderly  PSH cataract extraction  [facility] maternal breast cancer    Initial workup in emergency department with hypertension, cbc unremarkable, cmp with elevated alk phos, tbili, liver enzymes, amylase and lipase. Etoh serum within normal limits, urinalysis with bilirubin. Ct c/abdomen/p with duct dilatation and obstruction. Chest x-ray with increased density in right lobe.    Gastroenterology recommended hospitalization for eus and ercp tomorrow.    Hospital medicine has been consulted for admission under observation.      Past Medical History:   Diagnosis Date    Hypertension        Past Surgical History:   Procedure Laterality Date    CATARACT EXTRACTION  2021       Review of patient's allergies indicates:  No Known Allergies    No current  facility-administered medications on file prior to encounter.     Current Outpatient Medications on File Prior to Encounter   Medication Sig    hydrOXYzine (ATARAX) 50 MG tablet Take 1 tablet (50 mg total) by mouth 3 (three) times daily as needed (Allergic rash/itching).    metoprolol succinate (TOPROL-XL) 25 MG 24 hr tablet 1/2 tablet at bedtime    promethazine (PHENERGAN) 12.5 MG Tab Take 1 tablet (12.5 mg total) by mouth every 6 (six) hours as needed for Nausea.     Family History    None       Tobacco Use    Smoking status: Current Every Day Smoker     Packs/day: 1.00     Types: Cigarettes    Smokeless tobacco: Never Used   Substance and Sexual Activity    Alcohol use: Yes     Alcohol/week: 14.0 standard drinks     Types: 14 Cans of beer per week     Comment: socially    Drug use: No    Sexual activity: Not on file     Review of Systems   Constitutional:  Positive for appetite change, fatigue and unexpected weight change. Negative for activity change and fever.   HENT:  Positive for hearing loss. Negative for trouble swallowing.    Respiratory:  Negative for shortness of breath.    Cardiovascular:  Negative for chest pain.   Gastrointestinal:  Negative for abdominal distention, abdominal pain, diarrhea, nausea and vomiting.   Musculoskeletal:  Negative for gait problem.   Skin:  Positive for color change and rash.        Pruritis    Neurological:  Positive for weakness. Negative for headaches.   Psychiatric/Behavioral:  Negative for dysphoric mood. The patient is not nervous/anxious.    Objective:     Vital Signs (Most Recent):  Temp: 97 °F (36.1 °C) (05/28/22 1459)  Pulse: 90 (05/28/22 1459)  Resp: 18 (05/28/22 1459)  BP: (!) 188/98 (05/28/22 1459)  SpO2: 100 % (05/28/22 1459)   Vital Signs (24h Range):  Temp:  [97 °F (36.1 °C)-97.7 °F (36.5 °C)] 97 °F (36.1 °C)  Pulse:  [] 90  Resp:  [18-21] 18  SpO2:  [97 %-100 %] 100 %  BP: (159-200)/() 188/98     Weight: 37.5 kg (82 lb 10.8 oz)  Body  mass index is 14.19 kg/m².    Physical Exam  Vitals and nursing note reviewed. Exam conducted with a chaperone present (daughter).   Constitutional:       General: She is not in acute distress.     Appearance: She is ill-appearing. She is not toxic-appearing.   HENT:      Head: Normocephalic and atraumatic.   Eyes:      General: Scleral icterus present.   Cardiovascular:      Rate and Rhythm: Normal rate.   Pulmonary:      Effort: Pulmonary effort is normal. No respiratory distress.   Abdominal:      General: There is no distension.      Palpations: Abdomen is soft.      Tenderness: There is no abdominal tenderness.   Musculoskeletal:      Right lower leg: No edema.      Left lower leg: No edema.   Skin:     General: Skin is warm.      Coloration: Skin is jaundiced.   Neurological:      Mental Status: She is alert and oriented to person, place, and time.      Motor: No weakness.   Psychiatric:         Mood and Affect: Mood normal.         Behavior: Behavior normal.           Significant Labs: All pertinent labs within the past 24 hours have been reviewed.  Bilirubin:   Recent Labs   Lab 05/28/22 0722   BILITOT 23.2*     Blood Culture: No results for input(s): LABBLOO in the last 48 hours.  CBC:   Recent Labs   Lab 05/28/22 0722   WBC 8.52   HGB 12.9   HCT 36.2*        CMP:   Recent Labs   Lab 05/28/22 0722   *   K 4.1   CL 97   CO2 24   GLU 99   BUN 13   CREATININE 0.6   CALCIUM 9.7   PROT 7.5   ALBUMIN 2.7*   BILITOT 23.2*   ALKPHOS 1,705*   *   *   ANIONGAP 13   EGFRNONAA >60.0     Coagulation:   Recent Labs   Lab 05/28/22 0722   INR 1.1   APTT 27.3     Lipase:   Recent Labs   Lab 05/28/22 0722   LIPASE 230*       Significant Imaging: I have reviewed all pertinent imaging results/findings within the past 24 hours.  CT: I have reviewed all pertinent results/findings within the past 24 hours and my personal findings are:  ductal dilitation and obstruction  CXR: I have reviewed all  pertinent results/findings within the past 24 hours and my personal findings are:  right lobe hyperdensity    Assessment/Plan:     * Obstruction of bile duct  Concerns for mass involving pancreatic head  Gastroenterology consulted  Npo after mn for eus and ercp  Ca19-9        Tobacco use disorder  Nicotine transdermal      Abnormal CXR  Increased density right lung base  Ct chest with emphysema but no suspicious masses   Tobacco use disorder       HTN (hypertension)  Resume home medication(s)  Hydralazine prn      Elevated liver enzymes  Intrahepatic and extrahepatic ductal dilatation on imaging  No focal liver masses  eus and ercp in am per gastroenterology   Avoid liver toxins  etoh serum within normal limits       VTE Risk Mitigation (From admission, onward)         Ordered     heparin (porcine) injection 5,000 Units  Every 8 hours         05/28/22 1524     IP VTE HIGH RISK PATIENT  Once         05/28/22 1524     Place sequential compression device  Until discontinued         05/28/22 1524     Place sequential compression device  Until discontinued         05/28/22 1507                   Lien Dolan MD  Department of Hospital Medicine   [facility]

## 2022-05-29 ENCOUNTER — ANESTHESIA (OUTPATIENT)
Dept: ENDOSCOPY | Facility: HOSPITAL | Age: 82
DRG: 435 | End: 2022-05-29
Payer: MEDICARE

## 2022-05-29 PROBLEM — K86.89 PANCREATIC MASS: Status: ACTIVE | Noted: 2022-05-29

## 2022-05-29 LAB
BASOPHILS # BLD AUTO: 0.04 K/UL (ref 0–0.2)
BASOPHILS NFR BLD: 0.5 % (ref 0–1.9)
DIFFERENTIAL METHOD: ABNORMAL
EOSINOPHIL # BLD AUTO: 0.1 K/UL (ref 0–0.5)
EOSINOPHIL NFR BLD: 0.8 % (ref 0–8)
ERYTHROCYTE [DISTWIDTH] IN BLOOD BY AUTOMATED COUNT: 17.9 % (ref 11.5–14.5)
HCT VFR BLD AUTO: 31.3 % (ref 37–48.5)
HGB BLD-MCNC: 10.7 G/DL (ref 12–16)
IMM GRANULOCYTES # BLD AUTO: 0.07 K/UL (ref 0–0.04)
IMM GRANULOCYTES NFR BLD AUTO: 0.8 % (ref 0–0.5)
LYMPHOCYTES # BLD AUTO: 0.7 K/UL (ref 1–4.8)
LYMPHOCYTES NFR BLD: 7.9 % (ref 18–48)
MCH RBC QN AUTO: 31 PG (ref 27–31)
MCHC RBC AUTO-ENTMCNC: 34.2 G/DL (ref 32–36)
MCV RBC AUTO: 91 FL (ref 82–98)
MONOCYTES # BLD AUTO: 0.6 K/UL (ref 0.3–1)
MONOCYTES NFR BLD: 7.3 % (ref 4–15)
NEUTROPHILS # BLD AUTO: 7.2 K/UL (ref 1.8–7.7)
NEUTROPHILS NFR BLD: 82.7 % (ref 38–73)
NRBC BLD-RTO: 0 /100 WBC
PLATELET # BLD AUTO: 337 K/UL (ref 150–450)
PMV BLD AUTO: 12.2 FL (ref 9.2–12.9)
RBC # BLD AUTO: 3.45 M/UL (ref 4–5.4)
WBC # BLD AUTO: 8.68 K/UL (ref 3.9–12.7)

## 2022-05-29 PROCEDURE — 88305 TISSUE EXAM BY PATHOLOGIST: CPT | Mod: 26,,, | Performed by: PATHOLOGY

## 2022-05-29 PROCEDURE — 21400001 HC TELEMETRY ROOM

## 2022-05-29 PROCEDURE — 25000003 PHARM REV CODE 250: Performed by: NURSE ANESTHETIST, CERTIFIED REGISTERED

## 2022-05-29 PROCEDURE — 88342 IMHCHEM/IMCYTCHM 1ST ANTB: CPT | Performed by: PATHOLOGY

## 2022-05-29 PROCEDURE — 88305 TISSUE EXAM BY PATHOLOGIST: CPT | Mod: 59 | Performed by: PATHOLOGY

## 2022-05-29 PROCEDURE — 88341 PR IHC OR ICC EACH ADD'L SINGLE ANTIBODY  STAINPR: ICD-10-PCS | Mod: 26,,, | Performed by: PATHOLOGY

## 2022-05-29 PROCEDURE — 27202131 HC NEEDLE, FNB SINGLE (ANY): Performed by: INTERNAL MEDICINE

## 2022-05-29 PROCEDURE — 43261 PR ERCP,BIOPSY: ICD-10-PCS | Mod: 59,,, | Performed by: INTERNAL MEDICINE

## 2022-05-29 PROCEDURE — 74328 PR  X-RAY FOR BILE DUCT ENDOSCOPY: ICD-10-PCS | Mod: 26,,, | Performed by: INTERNAL MEDICINE

## 2022-05-29 PROCEDURE — C2625 STENT, NON-COR, TEM W/DEL SY: HCPCS | Performed by: INTERNAL MEDICINE

## 2022-05-29 PROCEDURE — 37000008 HC ANESTHESIA 1ST 15 MINUTES: Performed by: INTERNAL MEDICINE

## 2022-05-29 PROCEDURE — 88173 CYTOPATH EVAL FNA REPORT: CPT | Mod: 26,,, | Performed by: PATHOLOGY

## 2022-05-29 PROCEDURE — 43261 ENDO CHOLANGIOPANCREATOGRAPH: CPT | Mod: 59,,, | Performed by: INTERNAL MEDICINE

## 2022-05-29 PROCEDURE — A4216 STERILE WATER/SALINE, 10 ML: HCPCS | Performed by: FAMILY MEDICINE

## 2022-05-29 PROCEDURE — C1769 GUIDE WIRE: HCPCS | Performed by: INTERNAL MEDICINE

## 2022-05-29 PROCEDURE — S4991 NICOTINE PATCH NONLEGEND: HCPCS | Performed by: FAMILY MEDICINE

## 2022-05-29 PROCEDURE — 43238 EGD US FINE NEEDLE BX/ASPIR: CPT | Performed by: INTERNAL MEDICINE

## 2022-05-29 PROCEDURE — 25000003 PHARM REV CODE 250: Performed by: NURSE PRACTITIONER

## 2022-05-29 PROCEDURE — 88305 TISSUE EXAM BY PATHOLOGIST: CPT | Performed by: PATHOLOGY

## 2022-05-29 PROCEDURE — 74328 X-RAY BILE DUCT ENDOSCOPY: CPT | Mod: 26,,, | Performed by: INTERNAL MEDICINE

## 2022-05-29 PROCEDURE — 25000003 PHARM REV CODE 250: Performed by: FAMILY MEDICINE

## 2022-05-29 PROCEDURE — 43274 ERCP DUCT STENT PLACEMENT: CPT | Performed by: INTERNAL MEDICINE

## 2022-05-29 PROCEDURE — 88305 TISSUE EXAM BY PATHOLOGIST: ICD-10-PCS | Mod: 26,,, | Performed by: PATHOLOGY

## 2022-05-29 PROCEDURE — 27201012 HC FORCEPS, HOT/COLD, DISP: Performed by: INTERNAL MEDICINE

## 2022-05-29 PROCEDURE — 88341 IMHCHEM/IMCYTCHM EA ADD ANTB: CPT | Mod: 26,,, | Performed by: PATHOLOGY

## 2022-05-29 PROCEDURE — 11000001 HC ACUTE MED/SURG PRIVATE ROOM

## 2022-05-29 PROCEDURE — 88342 IMHCHEM/IMCYTCHM 1ST ANTB: CPT | Mod: 26,,, | Performed by: PATHOLOGY

## 2022-05-29 PROCEDURE — 99223 PR INITIAL HOSPITAL CARE,LEVL III: ICD-10-PCS | Mod: 25,,, | Performed by: INTERNAL MEDICINE

## 2022-05-29 PROCEDURE — 43238 EGD US FINE NEEDLE BX/ASPIR: CPT | Mod: 51,,, | Performed by: INTERNAL MEDICINE

## 2022-05-29 PROCEDURE — 43261 ENDO CHOLANGIOPANCREATOGRAPH: CPT | Performed by: INTERNAL MEDICINE

## 2022-05-29 PROCEDURE — 43274 ERCP DUCT STENT PLACEMENT: CPT | Mod: ,,, | Performed by: INTERNAL MEDICINE

## 2022-05-29 PROCEDURE — 25500020 PHARM REV CODE 255: Performed by: INTERNAL MEDICINE

## 2022-05-29 PROCEDURE — 27201674 HC SPHINCTERTOME: Performed by: INTERNAL MEDICINE

## 2022-05-29 PROCEDURE — 74328 X-RAY BILE DUCT ENDOSCOPY: CPT | Performed by: INTERNAL MEDICINE

## 2022-05-29 PROCEDURE — 88173 CYTOPATH EVAL FNA REPORT: CPT | Performed by: PATHOLOGY

## 2022-05-29 PROCEDURE — 99223 1ST HOSP IP/OBS HIGH 75: CPT | Mod: 25,,, | Performed by: INTERNAL MEDICINE

## 2022-05-29 PROCEDURE — 63600175 PHARM REV CODE 636 W HCPCS: Performed by: FAMILY MEDICINE

## 2022-05-29 PROCEDURE — 36415 COLL VENOUS BLD VENIPUNCTURE: CPT | Performed by: FAMILY MEDICINE

## 2022-05-29 PROCEDURE — 43238 PR UPGI ENDOSCOPY W/US FN BX: ICD-10-PCS | Mod: 51,,, | Performed by: INTERNAL MEDICINE

## 2022-05-29 PROCEDURE — 43274 PR ERCP W/STENT PLCMNT BILIARY/PANCREATIC DUCT: ICD-10-PCS | Mod: ,,, | Performed by: INTERNAL MEDICINE

## 2022-05-29 PROCEDURE — 88173 PR  INTERPRETATION OF FNA SMEAR: ICD-10-PCS | Mod: 26,,, | Performed by: PATHOLOGY

## 2022-05-29 PROCEDURE — 85025 COMPLETE CBC W/AUTO DIFF WBC: CPT | Performed by: FAMILY MEDICINE

## 2022-05-29 PROCEDURE — 88341 IMHCHEM/IMCYTCHM EA ADD ANTB: CPT | Performed by: PATHOLOGY

## 2022-05-29 PROCEDURE — 37000009 HC ANESTHESIA EA ADD 15 MINS: Performed by: INTERNAL MEDICINE

## 2022-05-29 PROCEDURE — 88342 CHG IMMUNOCYTOCHEMISTRY: ICD-10-PCS | Mod: 26,,, | Performed by: PATHOLOGY

## 2022-05-29 PROCEDURE — 96372 THER/PROPH/DIAG INJ SC/IM: CPT | Performed by: FAMILY MEDICINE

## 2022-05-29 PROCEDURE — 63600175 PHARM REV CODE 636 W HCPCS: Performed by: NURSE ANESTHETIST, CERTIFIED REGISTERED

## 2022-05-29 PROCEDURE — 96374 THER/PROPH/DIAG INJ IV PUSH: CPT | Performed by: EMERGENCY MEDICINE

## 2022-05-29 DEVICE — IMPLANTABLE DEVICE: Type: IMPLANTABLE DEVICE | Site: BILE DUCT | Status: FUNCTIONAL

## 2022-05-29 RX ORDER — ONDANSETRON 2 MG/ML
INJECTION INTRAMUSCULAR; INTRAVENOUS
Status: DISCONTINUED | OUTPATIENT
Start: 2022-05-29 | End: 2022-05-29

## 2022-05-29 RX ORDER — LIDOCAINE HYDROCHLORIDE 10 MG/ML
INJECTION, SOLUTION EPIDURAL; INFILTRATION; INTRACAUDAL; PERINEURAL
Status: DISCONTINUED | OUTPATIENT
Start: 2022-05-29 | End: 2022-05-29

## 2022-05-29 RX ORDER — METOPROLOL SUCCINATE 25 MG/1
25 TABLET, EXTENDED RELEASE ORAL DAILY
Status: DISCONTINUED | OUTPATIENT
Start: 2022-05-29 | End: 2022-05-30 | Stop reason: HOSPADM

## 2022-05-29 RX ORDER — PHENYLEPHRINE HYDROCHLORIDE 10 MG/ML
INJECTION INTRAVENOUS
Status: DISCONTINUED | OUTPATIENT
Start: 2022-05-29 | End: 2022-05-29

## 2022-05-29 RX ORDER — ROCURONIUM BROMIDE 10 MG/ML
INJECTION, SOLUTION INTRAVENOUS
Status: DISCONTINUED | OUTPATIENT
Start: 2022-05-29 | End: 2022-05-29

## 2022-05-29 RX ORDER — SUCCINYLCHOLINE CHLORIDE 20 MG/ML
INJECTION INTRAMUSCULAR; INTRAVENOUS
Status: DISCONTINUED | OUTPATIENT
Start: 2022-05-29 | End: 2022-05-29

## 2022-05-29 RX ORDER — PROPOFOL 10 MG/ML
VIAL (ML) INTRAVENOUS
Status: DISCONTINUED | OUTPATIENT
Start: 2022-05-29 | End: 2022-05-29

## 2022-05-29 RX ADMIN — METOPROLOL SUCCINATE 25 MG: 25 TABLET, EXTENDED RELEASE ORAL at 12:05

## 2022-05-29 RX ADMIN — PHENYLEPHRINE HYDROCHLORIDE 200 MCG: 10 INJECTION INTRAVENOUS at 08:05

## 2022-05-29 RX ADMIN — Medication 10 ML: at 05:05

## 2022-05-29 RX ADMIN — METOPROLOL SUCCINATE 12.5 MG: 25 TABLET, EXTENDED RELEASE ORAL at 10:05

## 2022-05-29 RX ADMIN — PHENYLEPHRINE HYDROCHLORIDE 300 MCG: 10 INJECTION INTRAVENOUS at 08:05

## 2022-05-29 RX ADMIN — HEPARIN SODIUM 5000 UNITS: 5000 INJECTION INTRAVENOUS; SUBCUTANEOUS at 05:05

## 2022-05-29 RX ADMIN — PROPOFOL 150 MG: 10 INJECTION, EMULSION INTRAVENOUS at 08:05

## 2022-05-29 RX ADMIN — SUCCINYLCHOLINE CHLORIDE 140 MG: 20 INJECTION, SOLUTION INTRAMUSCULAR; INTRAVENOUS at 08:05

## 2022-05-29 RX ADMIN — NICOTINE 1 PATCH: 7 PATCH TRANSDERMAL at 10:05

## 2022-05-29 RX ADMIN — ONDANSETRON 4 MG: 2 INJECTION, SOLUTION INTRAMUSCULAR; INTRAVENOUS at 08:05

## 2022-05-29 RX ADMIN — SODIUM CHLORIDE, POTASSIUM CHLORIDE, SODIUM LACTATE AND CALCIUM CHLORIDE: 600; 310; 30; 20 INJECTION, SOLUTION INTRAVENOUS at 08:05

## 2022-05-29 RX ADMIN — HEPARIN SODIUM 5000 UNITS: 5000 INJECTION INTRAVENOUS; SUBCUTANEOUS at 10:05

## 2022-05-29 RX ADMIN — LABETALOL HYDROCHLORIDE 10 MG: 5 INJECTION, SOLUTION INTRAVENOUS at 12:05

## 2022-05-29 RX ADMIN — Medication 10 ML: at 01:05

## 2022-05-29 RX ADMIN — ROCURONIUM BROMIDE 5 MG: 10 INJECTION, SOLUTION INTRAVENOUS at 08:05

## 2022-05-29 RX ADMIN — IOHEXOL 15 ML: 240 INJECTION, SOLUTION INTRATHECAL; INTRAVASCULAR; INTRAVENOUS; ORAL at 09:05

## 2022-05-29 RX ADMIN — HEPARIN SODIUM 5000 UNITS: 5000 INJECTION INTRAVENOUS; SUBCUTANEOUS at 01:05

## 2022-05-29 RX ADMIN — LIDOCAINE HYDROCHLORIDE 50 MG: 10 INJECTION, SOLUTION EPIDURAL; INFILTRATION; INTRACAUDAL; PERINEURAL at 08:05

## 2022-05-29 NOTE — ASSESSMENT & PLAN NOTE
Increased density right lung base  Ct chest with emphysema but no suspicious masses   Tobacco use disorder  Follow up with PCP

## 2022-05-29 NOTE — ASSESSMENT & PLAN NOTE
Concerns for mass involving pancreatic head  Gastroenterology consulted  S/P EUS and ERCP completed. 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 Biopsies were obtained with EUS and ERCP. A plastic biliary stent was placed. Will await path and repeat ERCP in 2 months to exchange the stent. Follow up with GI and oncology.      Repeat liver enzymes tomorrow and if improving can go home.     Ca19-9 pending

## 2022-05-29 NOTE — PROGRESS NOTES
EUS and ERCP completed. 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 Biopsies were obtained with EUS and ERCP. A plastic biliary stent was placed. Will await path and repeat ERCP in 2 months to exchange the stent. Follow up with GI and oncology.     Repeat liver enzymes tomorrow and if improving can go home.    Pilar Marie MD  Gastroenterology  Director of Advanced Endoscopy at [facility]

## 2022-05-29 NOTE — TRANSFER OF CARE
"Anesthesia Transfer of Care Note    Patient: Ann Marie Correa    Procedure(s) Performed: Procedure(s) (LRB):  ERCP (ENDOSCOPIC RETROGRADE CHOLANGIOPANCREATOGRAPHY) (N/A)  ULTRASOUND, UPPER GI TRACT, ENDOSCOPIC (N/A)    Patient location: PACU    Anesthesia Type: general    Transport from OR: Transported from OR on room air with adequate spontaneous ventilation    Post pain: adequate analgesia    Post assessment: no apparent anesthetic complications and tolerated procedure well    Post vital signs: stable    Level of consciousness: awake    Nausea/Vomiting: no nausea/vomiting    Complications: none    Transfer of care protocol was followed      Last vitals:   Visit Vitals  BP (!) 171/80 (BP Location: Left arm, Patient Position: Lying)   Pulse 82   Temp 36.8 °C (98.3 °F) (Oral)   Resp 18   Ht 5' 4" (1.626 m)   Wt 37.5 kg (82 lb 10.8 oz)   SpO2 99%   Breastfeeding No   BMI 14.19 kg/m²     "

## 2022-05-29 NOTE — [employer]OVATION PATIENT INSTRUCTIONS
Discharge Summary/Instructions after an Endoscopic Procedure  Patient Name: Ann Marie Correa  Patient MRN: [id number]  Patient YOB: 1940  [place], May 29, 2022 Pilar Marie MD  Dear patient,  As a result of recent federal legislation (The Federal Cures Act), you may   receive lab or pathology results from your procedure in your [facility]   account before your physician is able to contact you. Your physician or   their representative will relay the results to you with their   recommendations at their soonest availability.  Thank you,  RESTRICTIONS:  During your procedure today, you received medications for sedation.  These   medications may affect your judgment, balance and coordination.  Therefore,   for 24 hours, you have the following restrictions:   - DO NOT drive a car, operate machinery, make legal/financial decisions,   sign important papers or drink alcohol.    ACTIVITY:  Today: no heavy lifting, straining or running due to procedural   sedation/anesthesia.  The following day: return to full activity including work.  DIET:  Eat and drink normally unless instructed otherwise.     TREATMENT FOR COMMON SIDE EFFECTS:  - Mild abdominal pain, nausea, belching, bloating or excessive gas:  rest,   eat lightly and use a heating pad.  - Sore Throat: treat with throat lozenges and/or gargle with warm salt   water.  - Because air was used during the procedure, expelling large amounts of air   from your rectum or belching is normal.  - If a bowel prep was taken, you may not have a bowel movement for 1-3 days.    This is normal.  SYMPTOMS TO WATCH FOR AND REPORT TO YOUR PHYSICIAN:  1. Abdominal pain or bloating, other than gas cramps.  2. Chest pain.  3. Back pain.  4. Signs of infection such as: chills or fever occurring within 24 hours   after the procedure.  5. Rectal bleeding, which would show as bright red, maroon, or black stools.   (A tablespoon of blood from the rectum is not serious,  especially if   hemorrhoids are present.)  6. Vomiting.  7. Weakness or dizziness.  GO DIRECTLY TO THE NEAREST EMERGENCY ROOM IF YOU HAVE ANY OF THE FOLLOWING:      Difficulty breathing              Chills and/or fever over 101 F   Persistent vomiting and/or vomiting blood   Severe abdominal pain   Severe chest pain   Black, tarry stools   Bleeding- more than one tablespoon   Any other symptom or condition that you feel may need urgent attention  Your doctor recommends these additional instructions:  If any biopsies were taken, your doctors clinic will contact you in 1 to 2   weeks with any results.  - [facility] to [facility] for [facility].   - Resume previous diet.   - Continue present medications.   - Await pathology results.   - Perform an ERCP.  For questions, problems or results please call your physician Pilar Marie MD at Work:  [phone]  If you have any questions about the above instructions, call the GI   department at [phone] or call the endoscopy unit at [phone]   from 7am until 3 pm.  [facility], EMERGENCY ROOM PHONE NUMBER:   [phone]  IF A COMPLICATION OR EMERGENCY SITUATION ARISES AND YOU ARE UNABLE TO REACH   YOUR PHYSICIAN - GO DIRECTLY TO THE EMERGENCY ROOM.  I have read or have had read to me these discharge instructions for my   procedure and have received a written copy.  I understand these   instructions and will follow-up with my physician if I have any questions.     __________________________________       _____________________________________  Nurse Signature                                          Patient/Designated   Responsible Party Signature  MD Pilar Al MD  5/29/2022 10:17:56 AM  This report has been verified and signed electronically.  Dear patient,  As a result of recent federal legislation (The Federal Cures Act), you may   receive lab or pathology results from  your procedure in your [facility]   account before your physician is able to contact you. Your physician or   their representative will relay the results to you with their   recommendations at their soonest availability.  Thank you,  PROVATION

## 2022-05-29 NOTE — ASSESSMENT & PLAN NOTE
Intrahepatic and extrahepatic ductal dilatation on imaging  No focal liver masses  eus and ercp complete  Avoid liver toxins  etoh serum within normal limits   Trending

## 2022-05-29 NOTE — PROGRESS NOTES
[facility] Medicine  Progress Note    Patient Name: Ann Marie Correa  MRN: [id number]  Patient Class: IP- Inpatient   Admission Date: 5/28/2022  Length of Stay: 0 days  Attending Physician: Jeronimo Huang MD  Primary Care Provider: Primary Doctor No        Subjective:     Principal Problem:Pancreatic mass        HPI:  82F PMH hypertension, who presented to emergency department in [facility] by daughter who is a nurse with concerns for jaundice x 1w. Infrequently seeks medical care, last known checkup or labs were about a year ago. Symptoms now include weight loss of about 10% of her previous body weight over an uncertain period of time, decreased appetite, increase in chronic skin problems in the form of itching. Denies chest pain, dyspnea, dysphagia, fever, abdominal pain, any sign of bleeding, dyspnea, urinary complaints, or any other problems.      SH etoh (2-3 beers, socially), smoker (1ppd since 19yo); denies illicit drug use; cares for elderly  PSH cataract extraction  [facility] maternal breast cancer    Initial workup in emergency department with hypertension, cbc unremarkable, cmp with elevated alk phos, tbili, liver enzymes, amylase and lipase. Etoh serum within normal limits, urinalysis with bilirubin. Ct c/abdomen/p with duct dilatation and obstruction. Chest x-ray with increased density in right lobe.    Gastroenterology recommended hospitalization for eus and ercp tomorrow.    Hospital medicine has been consulted for admission under observation.      Overview/Hospital Course:  Pt underwent a EUS and ERCP by Dr. Christy. 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 Biopsies were obtained with EUS and ERCP. A plastic biliary stent was placed. Will await path and repeat ERCP in 2 months to exchange the stent. Follow up with GI and oncology.    Repeat liver enzymes tomorrow and if improving can go home.  Pt denies any abdominal pain, nausea or vomiting after  procedure.          Interval History: See Hospital Course    Review of Systems   Constitutional:  Positive for appetite change, fatigue and unexpected weight change. Negative for activity change and fever.   HENT:  Positive for hearing loss. Negative for trouble swallowing.    Respiratory:  Negative for shortness of breath.    Cardiovascular:  Negative for chest pain.   Gastrointestinal:  Negative for abdominal distention, abdominal pain, diarrhea, nausea and vomiting.   Musculoskeletal:  Negative for gait problem.   Skin:  Positive for color change and rash.        Pruritis    Neurological:  Positive for weakness. Negative for headaches.   Psychiatric/Behavioral:  Negative for dysphoric mood. The patient is not nervous/anxious.    Objective:     Vital Signs (Most Recent):  Temp: 97.4 °F (36.3 °C) (05/29/22 1133)  Pulse: 80 (05/29/22 1133)  Resp: 20 (05/29/22 1133)  BP: (!) 169/79 (05/29/22 1133)  SpO2: (!) 94 % (05/29/22 1133)   Vital Signs (24h Range):  Temp:  [97 °F (36.1 °C)-98.3 °F (36.8 °C)] 97.4 °F (36.3 °C)  Pulse:  [68-96] 80  Resp:  [17-21] 20  SpO2:  [93 %-100 %] 94 %  BP: (160-188)/() 169/79     Weight: 37.5 kg (82 lb 10.8 oz)  Body mass index is 14.19 kg/m².    Intake/Output Summary (Last 24 hours) at 5/29/2022 1215  Last data filed at 5/29/2022 0929  Gross per 24 hour   Intake 1189.85 ml   Output --   Net 1189.85 ml      Physical Exam  Vitals and nursing note reviewed. Exam conducted with a chaperone present (daughter).   Constitutional:       General: She is not in acute distress.     Appearance: She is ill-appearing. She is not toxic-appearing.      Comments: Thin in stature   HENT:      Head: Normocephalic and atraumatic.   Eyes:      General: Scleral icterus present.   Cardiovascular:      Rate and Rhythm: Normal rate.   Pulmonary:      Effort: Pulmonary effort is normal. No respiratory distress.   Abdominal:      General: There is no distension.      Palpations: Abdomen is soft.       Tenderness: There is no abdominal tenderness.   Musculoskeletal:      Right lower leg: No edema.      Left lower leg: No edema.   Skin:     General: Skin is warm.      Coloration: Skin is jaundiced.   Neurological:      Mental Status: She is alert and oriented to person, place, and time.      Motor: No weakness.   Psychiatric:         Mood and Affect: Mood normal.         Behavior: Behavior normal.       Significant Labs: All pertinent labs within the past 24 hours have been reviewed.  CBC:   Recent Labs   Lab 05/28/22  0722 05/29/22  0638   WBC 8.52 8.68   HGB 12.9 10.7*   HCT 36.2* 31.3*    337     CMP:   Recent Labs   Lab 05/28/22  0722   *   K 4.1   CL 97   CO2 24   GLU 99   BUN 13   CREATININE 0.6   CALCIUM 9.7   PROT 7.5   ALBUMIN 2.7*   BILITOT 23.2*   ALKPHOS 1,705*   *   *   ANIONGAP 13   EGFRNONAA >60.0       Significant Imaging: I have reviewed all pertinent imaging results/findings within the past 24 hours.      Assessment/Plan:      * Pancreatic mass  EUS and ERCP completed. 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 Biopsies were obtained with EUS and ERCP. A plastic biliary stent was placed. Will await path and repeat ERCP in 2 months to exchange the stent. Follow up with GI and oncology.      Repeat liver enzymes tomorrow and if improving can go home.         Tobacco use disorder  Nicotine transdermal  Smoking cessation advised      Abnormal CXR  Increased density right lung base  Ct chest with emphysema but no suspicious masses   Tobacco use disorder  Follow up with PCP       HTN (hypertension)  Elevated  Resume home medication(s) - Toprol XL  Hydralazine prn      Elevated liver enzymes  Intrahepatic and extrahepatic ductal dilatation on imaging  No focal liver masses  eus and ercp complete  Avoid liver toxins  etoh serum within normal limits   Trending      Obstruction of bile duct  Concerns for mass involving pancreatic  head  Gastroenterology consulted  S/P EUS and ERCP completed. 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 Biopsies were obtained with EUS and ERCP. A plastic biliary stent was placed. Will await path and repeat ERCP in 2 months to exchange the stent. Follow up with GI and oncology.      Repeat liver enzymes tomorrow and if improving can go home.     Ca19-9 pending          VTE Risk Mitigation (From admission, onward)         Ordered     heparin (porcine) injection 5,000 Units  Every 8 hours         05/28/22 1524     IP VTE HIGH RISK PATIENT  Once         05/28/22 1524     Place sequential compression device  Until discontinued         05/28/22 1524     Place sequential compression device  Until discontinued         05/28/22 1507                Discharge Planning   SARAH:      Code Status: Full Code   Is the patient medically ready for discharge?:     Reason for patient still in hospital (select all that apply): Patient trending condition                     Chloe Parmar NP  Department of Hospital Medicine   [facility]

## 2022-05-29 NOTE — PROVATION PATIENT INSTRUCTIONS
Discharge Summary/Instructions after an Endoscopic Procedure  Patient Name: Ann Marie Correa  Patient MRN: [id number]  Patient YOB: 1940  [place], May 29, 2022 Pilar Marie MD  Dear patient,  As a result of recent federal legislation (The Federal Cures Act), you may   receive lab or pathology results from your procedure in your [facility]   account before your physician is able to contact you. Your physician or   their representative will relay the results to you with their   recommendations at their soonest availability.  Thank you,  RESTRICTIONS:  During your procedure today, you received medications for sedation.  These   medications may affect your judgment, balance and coordination.  Therefore,   for 24 hours, you have the following restrictions:   - DO NOT drive a car, operate machinery, make legal/financial decisions,   sign important papers or drink alcohol.    ACTIVITY:  Today: no heavy lifting, straining or running due to procedural   sedation/anesthesia.  The following day: return to full activity including work.  DIET:  Eat and drink normally unless instructed otherwise.     TREATMENT FOR COMMON SIDE EFFECTS:  - Mild abdominal pain, nausea, belching, bloating or excessive gas:  rest,   eat lightly and use a heating pad.  - Sore Throat: treat with throat lozenges and/or gargle with warm salt   water.  - Because air was used during the procedure, expelling large amounts of air   from your rectum or belching is normal.  - If a bowel prep was taken, you may not have a bowel movement for 1-3 days.    This is normal.  SYMPTOMS TO WATCH FOR AND REPORT TO YOUR PHYSICIAN:  1. Abdominal pain or bloating, other than gas cramps.  2. Chest pain.  3. Back pain.  4. Signs of infection such as: chills or fever occurring within 24 hours   after the procedure.  5. Rectal bleeding, which would show as bright red, maroon, or black stools.   (A tablespoon of blood from the rectum is not serious,  especially if   hemorrhoids are present.)  6. Vomiting.  7. Weakness or dizziness.  GO DIRECTLY TO THE NEAREST EMERGENCY ROOM IF YOU HAVE ANY OF THE FOLLOWING:      Difficulty breathing              Chills and/or fever over 101 F   Persistent vomiting and/or vomiting blood   Severe abdominal pain   Severe chest pain   Black, tarry stools   Bleeding- more than one tablespoon   Any other symptom or condition that you feel may need urgent attention  Your doctor recommends these additional instructions:  If any biopsies were taken, your doctors clinic will contact you in 1 to 2   weeks with any results.  - [facility] for [facility].   - Resume previous diet.   - Await pathology results.   - Follow up in GI clinic in 1 week  - Repeat ERCP in 2 months to exchange stent.  For questions, problems or results please call your physician Pilar Marie MD at Work:  [phone]  If you have any questions about the above instructions, call the GI   department at [phone] or call the endoscopy unit at [phone]   from 7am until 3 pm.  [facility] [place], EMERGENCY ROOM PHONE NUMBER:   [phone]  IF A COMPLICATION OR EMERGENCY SITUATION ARISES AND YOU ARE UNABLE TO REACH   YOUR PHYSICIAN - GO DIRECTLY TO THE EMERGENCY ROOM.  I have read or have had read to me these discharge instructions for my   procedure and have received a written copy.  I understand these   instructions and will follow-up with my physician if I have any questions.     __________________________________       _____________________________________  Nurse Signature                                          Patient/Designated   Responsible Party Signature  MD Pilar Al MD  5/29/2022 10:28:10 AM  This report has been verified and signed electronically.  Dear patient,  As a result of recent federal legislation (The Federal Cures Act), you may   receive lab  or pathology results from your procedure in your [facility]   account before your physician is able to contact you. Your physician or   their representative will relay the results to you with their   recommendations at their soonest availability.  Thank you,  PROVATION

## 2022-05-29 NOTE — H&P
PRE PROCEDURE H&P    Patient Name: Ann Marie Correa  MRN: [id number]  : 1940  Date of Procedure:  2022  Referring Physician: Irving, Elreferral  Primary Physician: Primary Doctor No  Procedure Physician: iPlar Marie MD       Planned Procedure: EUS and ERCP  Diagnosis: obstructive jaundice  Chief Complaint: Same as above    HPI: Patient is an 82 y.o. female is here for the above.     Endoscopic ultrasound with possible fine needle aspiration/biopsy was recommended. The procedure was described along with the risks and benefits. Risks include perforation (0.02%), pancreatitis (0-2%), bleeding (4%), sedation related adverse events. ERCP is currently recommended. The risks, benefits and alternatives of the procedure were discussed with the patient in detail. Benefits include removal of stones, stents, debri, sludge; dilation; placement of a stent; biopsies. Risks include bleeding (0.3-2%), pancreatitis (3%), cholangitis (0.5-3%), perforation (0.08-0.6%), cholecystitis (0.5%), sedation related adverse events. Educational material of the biliary anatomy has been provided today for educational purposes. Other risks include, risks of adverse reaction to sedation requiring the use of reversal agents, bleeding requiring blood transfusion, perforation requiring surgical intervention, technical failure, aspiration leading to respiratory distress and respiratory failure resulting in endotracheal intubation and mechanical ventilation including death. Anesthesia is utilized for this procedure, it is up to the anesthesiologist to determine airway safety including elective endotracheal intubation. Questions were answered, the patient agree to proceed. There was no language barriers.         Past Medical History:   Past Medical History:   Diagnosis Date    Hypertension         Past Surgical History:  Past Surgical History:   Procedure Laterality Date    CATARACT EXTRACTION          Home Medications:  Prior  "to Admission medications    Medication Sig Start Date End Date Taking? Authorizing Provider   hydrOXYzine (ATARAX) 50 MG tablet Take 1 tablet (50 mg total) by mouth 3 (three) times daily as needed (Allergic rash/itching). 10/18/18   Trery Escalante PA-C   metoprolol succinate (TOPROL-XL) 25 MG 24 hr tablet 1/2 tablet at bedtime 8/26/21   Historical Provider   promethazine (PHENERGAN) 12.5 MG Tab Take 1 tablet (12.5 mg total) by mouth every 6 (six) hours as needed for Nausea. 8/1/15   Jeronimo Patrick MD        Allergies:  Review of patient's allergies indicates:  No Known Allergies     Social History:   Social History     Socioeconomic History    Marital status:    Tobacco Use    Smoking status: Current Every Day Smoker     Packs/day: 1.00     Types: Cigarettes    Smokeless tobacco: Never Used   Substance and Sexual Activity    Alcohol use: Yes     Alcohol/week: 14.0 standard drinks     Types: 14 Cans of beer per week     Comment: socially    Drug use: No       Family History:  History reviewed. No pertinent family history.    ROS: No acute cardiac events, no acute respiratory complaints.     Physical Exam (all patients):    BP (!) 171/80 (BP Location: Left arm, Patient Position: Lying)   Pulse 82   Temp 98.3 °F (36.8 °C) (Oral)   Resp 18   Ht 5' 4" (1.626 m)   Wt 37.5 kg (82 lb 10.8 oz)   SpO2 99%   Breastfeeding No   BMI 14.19 kg/m²   Lungs: Clear to auscultation bilaterally, respirations unlabored  Heart: Regular rate and rhythm, S1 and S2 normal, no obvious murmurs  Abdomen:         Soft, non-tender, bowel sounds normal, no masses, no organomegaly    Lab Results   Component Value Date    WBC 8.68 05/29/2022    MCV 91 05/29/2022    RDW 17.9 (H) 05/29/2022     05/29/2022    INR 1.1 05/28/2022    GLU 99 05/28/2022    HGBA1C 5.4 08/26/2021    BUN 13 05/28/2022     (L) 05/28/2022    K 4.1 05/28/2022    CL 97 05/28/2022        SEDATION PLAN: per anesthesia      History reviewed, " vital signs satisfactory, cardiopulmonary status satisfactory, sedation options, risks and plans have been discussed with the patient  All their questions were answered and the patient agrees to the sedation procedures as planned and the patient is deemed an appropriate candidate for the sedation as planned.    Procedure explained to patient, informed consent obtained and placed in chart.    Pilar Marie  5/29/2022  8:29 AM

## 2022-05-29 NOTE — ANESTHESIA POSTPROCEDURE EVALUATION
Anesthesia Post Evaluation    Patient: Ann Marie Correa    Procedure(s) Performed: Procedure(s) (LRB):  ERCP (ENDOSCOPIC RETROGRADE CHOLANGIOPANCREATOGRAPHY) (N/A)  ULTRASOUND, UPPER GI TRACT, ENDOSCOPIC (N/A)    Final Anesthesia Type: general      Patient location during evaluation: PACU  Patient participation: Yes- Able to Participate  Level of consciousness: awake  Post-procedure vital signs: reviewed and stable  Pain management: adequate  Airway patency: patent    PONV status at discharge: No PONV  Anesthetic complications: no      Cardiovascular status: blood pressure returned to baseline and hemodynamically stable  Respiratory status: unassisted and spontaneous ventilation  Hydration status: euvolemic  Follow-up not needed.            No case tracking events are documented in the log.      Pain/Xu Score: No data recorded

## 2022-05-29 NOTE — SUBJECTIVE & OBJECTIVE
Interval History: See Hospital Course    Review of Systems   Constitutional:  Positive for appetite change, fatigue and unexpected weight change. Negative for activity change and fever.   HENT:  Positive for hearing loss. Negative for trouble swallowing.    Respiratory:  Negative for shortness of breath.    Cardiovascular:  Negative for chest pain.   Gastrointestinal:  Negative for abdominal distention, abdominal pain, diarrhea, nausea and vomiting.   Musculoskeletal:  Negative for gait problem.   Skin:  Positive for color change and rash.        Pruritis    Neurological:  Positive for weakness. Negative for headaches.   Psychiatric/Behavioral:  Negative for dysphoric mood. The patient is not nervous/anxious.    Objective:     Vital Signs (Most Recent):  Temp: 97.4 °F (36.3 °C) (05/29/22 1133)  Pulse: 80 (05/29/22 1133)  Resp: 20 (05/29/22 1133)  BP: (!) 169/79 (05/29/22 1133)  SpO2: (!) 94 % (05/29/22 1133)   Vital Signs (24h Range):  Temp:  [97 °F (36.1 °C)-98.3 °F (36.8 °C)] 97.4 °F (36.3 °C)  Pulse:  [68-96] 80  Resp:  [17-21] 20  SpO2:  [93 %-100 %] 94 %  BP: (160-188)/() 169/79     Weight: 37.5 kg (82 lb 10.8 oz)  Body mass index is 14.19 kg/m².    Intake/Output Summary (Last 24 hours) at 5/29/2022 1215  Last data filed at 5/29/2022 0929  Gross per 24 hour   Intake 1189.85 ml   Output --   Net 1189.85 ml      Physical Exam  Vitals and nursing note reviewed. Exam conducted with a chaperone present (daughter).   Constitutional:       General: She is not in acute distress.     Appearance: She is ill-appearing. She is not toxic-appearing.      Comments: Thin in stature   HENT:      Head: Normocephalic and atraumatic.   Eyes:      General: Scleral icterus present.   Cardiovascular:      Rate and Rhythm: Normal rate.   Pulmonary:      Effort: Pulmonary effort is normal. No respiratory distress.   Abdominal:      General: There is no distension.      Palpations: Abdomen is soft.      Tenderness: There is no  abdominal tenderness.   Musculoskeletal:      Right lower leg: No edema.      Left lower leg: No edema.   Skin:     General: Skin is warm.      Coloration: Skin is jaundiced.   Neurological:      Mental Status: She is alert and oriented to person, place, and time.      Motor: No weakness.   Psychiatric:         Mood and Affect: Mood normal.         Behavior: Behavior normal.       Significant Labs: All pertinent labs within the past 24 hours have been reviewed.  CBC:   Recent Labs   Lab 05/28/22  0722 05/29/22  0638   WBC 8.52 8.68   HGB 12.9 10.7*   HCT 36.2* 31.3*    337     CMP:   Recent Labs   Lab 05/28/22  0722   *   K 4.1   CL 97   CO2 24   GLU 99   BUN 13   CREATININE 0.6   CALCIUM 9.7   PROT 7.5   ALBUMIN 2.7*   BILITOT 23.2*   ALKPHOS 1,705*   *   *   ANIONGAP 13   EGFRNONAA >60.0       Significant Imaging: I have reviewed all pertinent imaging results/findings within the past 24 hours.

## 2022-05-29 NOTE — CONSULTS
Clinic Consult:  [facility] Consultation Note    Reason for Consult:  The primary encounter diagnosis was Jaundice. Diagnoses of Weakness, Weight loss, Abdominal mass, unspecified abdominal location, Tobacco abuse, and Chest pain were also pertinent to this visit.    PCP: Primary Doctor No   No address on file    HPI:  This is a 82 y.o. female here consulted for a pancreatic mass and obstructive jaundice. She has a medical history of hypertension and was brought by her daughter to the ER in [facility] with 1 week of jaundice.    Other symptoms include weight loss of about 10% of her previous body weight over an uncertain period of time, decreased appetite, pruritis. Denies chest pain, dyspnea, dysphagia, fever, abdominal pain, any sign of bleeding, dyspnea, urinary complaints, or any other problems.       SH etoh (2-3 beers, socially), smoker (1ppd since 19yo); denies illicit drug use; cares for elderly  PSH cataract extraction  [facility] maternal breast cancer     Liver enzymes showed ALP 1705, Tbili 23.2, , , lipase 230. Marked dilatation of the bile ducts and pancreatic ducts.  Obstruction of the distal common bile duct is suspected.  Etiology of obstruction is is indeterminate from this evaluation.  Further evaluation for obstructing stone occult mass involving the pancreatic head and uncinate process must be performed.  Consider ultrasound and contrast CT of the abdomen and pelvis and or MRI.    ROS:  CONSTITUTIONAL: Denies weight change,  fatigue, fevers, chills, night sweats.  EYES: No changes in vision.   ENT: No oral lesions or sore throat.  HEMATOLOGICAL/Lymph: Denies bleeding tendency, bruising tendency. No swellings or enlarged lymph nodes.  CARDIOVASCULAR: Denies chest pain, shortness of breath, orthopnea and edema.  RESPIRATORY: Denies cough, hemoptysis, dyspnea, and wheezing.  GI: See HPI.  : Denies dysuria and hematuria  MUSCULOSKELETAL: Denies joint pain or swelling, back pain  "and muscle pain.  SKIN: Denies rashes.  NEUROLOGIC: Denies headaches, seizures and numbness.  PSYCHIATRIC: Denies depression or anxiety.  ENDOCRINE: Denies heat or cold intolerance and excessive thirst or urination.    Medical History:   Past Medical History:   Diagnosis Date    Hypertension        Surgical History:  Past Surgical History:   Procedure Laterality Date    CATARACT EXTRACTION  2021       Family History:   History reviewed. No pertinent family history.    Social History:   Social History     Tobacco Use    Smoking status: Current Every Day Smoker     Packs/day: 1.00     Types: Cigarettes    Smokeless tobacco: Never Used   Substance Use Topics    Alcohol use: Yes     Alcohol/week: 14.0 standard drinks     Types: 14 Cans of beer per week     Comment: socially    Drug use: No       Allergies: Reviewed    Home Medications:   Current Discharge Medication List      CONTINUE these medications which have NOT CHANGED    Details   hydrOXYzine (ATARAX) 50 MG tablet Take 1 tablet (50 mg total) by mouth 3 (three) times daily as needed (Allergic rash/itching).  Qty: 15 tablet, Refills: 0      metoprolol succinate (TOPROL-XL) 25 MG 24 hr tablet 1/2 tablet at bedtime      promethazine (PHENERGAN) 12.5 MG Tab Take 1 tablet (12.5 mg total) by mouth every 6 (six) hours as needed for Nausea.  Qty: 8 tablet, Refills: 0               Physical Exam:  Vital Signs:  BP (!) 171/80 (BP Location: Left arm, Patient Position: Lying)   Pulse 82   Temp 98.3 °F (36.8 °C) (Oral)   Resp 18   Ht 5' 4" (1.626 m)   Wt 37.5 kg (82 lb 10.8 oz)   SpO2 99%   Breastfeeding No   BMI 14.19 kg/m²   Body mass index is 14.19 kg/m².      GENERAL: No acute distress, A&Ox3, jaundiced  EYES: icteric, no pallor noted.  ENT: OP clear  NECK: Supple, no masses, no thyromegally.  CHEST: Equal breath sounds bilaterally, no wheezing.  CARDIOVASCULAR: Regular rate and rhythm. Murmurs, rubs and gallops absent.  ABDOMEN: soft, non-tender, " non-distended, normal bowel sounds, no hepatosplenomegaly   EXTREMITIES: No clubbing, cyanosis or edema.  SKIN: Without lesion or erythema.  LYMPH: No cervical, axillary lymphadenopathy palpable.   NEUROLOGICAL: Grossly normal, no asterixis present.    Labs: Pertinent labs reviewed.    Assessment and Plan:  Jaundice  -     Case Request Endoscopy: ERCP (ENDOSCOPIC RETROGRADE CHOLANGIOPANCREATOGRAPHY), ULTRASOUND, UPPER GI TRACT, ENDOSCOPIC; Standing    Weakness  -     EKG 12-lead; Standing    Weight loss    Abdominal mass, unspecified abdominal location    Tobacco abuse    Chest pain  -     EKG 12-lead; Standing    Other orders  -     Saline lock IV; Standing  -     CBC auto differential; Standing  -     Comprehensive metabolic panel; Standing  -     POCT glucose; Standing  -     X-Ray Chest PA And Lateral; Standing  -     Urinalysis, Reflex to Urine Culture Urine, Clean Catch; Standing  -     Lipase; Standing  -     Amylase; Standing  -     Ammonia; Standing  -     Magnesium; Standing  -     POCT COVID-19 Rapid Screening; Standing  -     CT Chest Abdomen Pelvis Without Contrast (XPD); Standing  -     Hepatitis panel, acute; Standing  -     Protime-INR; Standing  -     APTT; Standing  -     Ethanol; Standing  -     POCT glucose; Standing  -     Cancer Antigen 19-9; Standing  -     Inpatient consult to Gastroenterology; Standing  -     Inpatient Consult to Telemedicine - Hepatology; Standing  -     Inpatient consult to Hospitalist; Standing  -     Transfer patient; Standing  -     Confirm Patient is not Eligible for Congestive Heart Failure Pathway; Standing  -     Vital signs; Standing  -     Intake and output; Standing  -     Notify physician ; Standing  -     sodium chloride 0.9% flush 10 mL  -     Cancel: IP VTE HIGH RISK PATIENT; Standing  -     Place sequential compression device; Standing  -     Pulse Oximetry Q4H; Standing  -     Full code; Standing  -     Place in Observation; Standing  -     Cardiac  Monitoring - Adult; Standing  -     lactated ringers infusion  -     Urinalysis Microscopic; Standing  -     PFC Facilitated Transportation Request; Standing  -     hydrOXYzine HCL tablet 50 mg  -     metoprolol succinate (TOPROL-XL) 24 hr split tablet 12.5 mg  -     Vital Signs; Standing  -     Weigh patient; Standing  -     Smoking Cessation Counseling; Standing  -     Bladder scan; Standing  -     Notify Physician; Standing  -     sodium chloride 0.9% flush 10 mL  -     Insert saline lock; Standing  -     IP VTE HIGH RISK PATIENT; Standing  -     Place sequential compression device; Standing  -     melatonin tablet 6 mg  -     polyethylene glycol packet 17 g  -     Discontinue: acetaminophen tablet 650 mg  -     naloxone 0.4 mg/mL injection 0.02 mg  -     Cold Compress - Apply cold to affected area; Standing  -     potassium bicarbonate disintegrating tablet 50 mEq  -     potassium bicarbonate disintegrating tablet 35 mEq  -     potassium bicarbonate disintegrating tablet 60 mEq  -     magnesium oxide tablet 800 mg  -     magnesium oxide tablet 800 mg  -     potassium, sodium phosphates 280-160-250 mg packet 2 packet  -     potassium, sodium phosphates 280-160-250 mg packet 2 packet  -     potassium, sodium phosphates 280-160-250 mg packet 2 packet  -     insulin aspart U-100 pen 0-5 Units  -     glucose chewable tablet 16 g  -     glucose chewable tablet 24 g  -     glucagon (human recombinant) injection 1 mg  -     Recheck Blood Glucose:; Standing  -     dextrose 10% bolus 125 mL  -     dextrose 10% bolus 250 mL  -     Cancel: Diet clear liquid; Standing  -     Diet NPO Except for: Sips with Medication; Standing  -     heparin (porcine) injection 5,000 Units  -     ketorolac injection 15 mg  -     ibuprofen tablet 400 mg  -     ibuprofen tablet 400 mg  -     Comprehensive Metabolic Panel (CMP); Standing  -     Lipase; Standing  -     Bilirubin, direct; Standing  -     Procalcitonin; Standing  -     Blood  culture (site 1); Standing  -     Blood culture (site 2); Standing  -     Inpatient consult to Gastroenterology; Standing  -     CBC with Automated Differential; Standing  -     PT/INR; Standing  -     acetaminophen tablet 650 mg  -     hydrALAZINE injection 10 mg  -     nicotine 7 mg/24 hr 1 patch  -     labetaloL injection 10 mg  -     END Endoscopic Ultrasound; Standing  -     US Endoscopic Ultrasound; Standing  -     Admit to Inpatient; Standing      Patient is a 82 year female with obstructive jaundice from possible pancreatic head mass on CT non-contrast. Discussed EUS and ERCP to assess and treat jaundice.     Endoscopic ultrasound with possible fine needle aspiration/biopsy was recommended. The procedure was described along with the risks and benefits. Risks include perforation (0.02%), pancreatitis (0-2%), bleeding (4%), sedation related adverse events. ERCP is currently recommended. The risks, benefits and alternatives of the procedure were discussed with the patient in detail. Benefits include removal of stones, stents, debri, sludge; dilation; placement of a stent; biopsies. Risks include bleeding (0.3-2%), pancreatitis (3%), cholangitis (0.5-3%), perforation (0.08-0.6%), cholecystitis (0.5%), sedation related adverse events. Educational material of the biliary anatomy has been provided today for educational purposes. Other risks include, risks of adverse reaction to sedation requiring the use of reversal agents, bleeding requiring blood transfusion, perforation requiring surgical intervention, technical failure, aspiration leading to respiratory distress and respiratory failure resulting in endotracheal intubation and mechanical ventilation including death. Anesthesia is utilized for this procedure, it is up to the anesthesiologist to determine airway safety including elective endotracheal intubation. Questions were answered, the patient agree to proceed. There was no language barriers.         No  follow-ups on file.        Thank you so much for allowing me to participate in the care of Ann Marie Marie MD  Gastroenterology

## 2022-05-29 NOTE — HOSPITAL COURSE
Pt underwent a EUS and ERCP by Dr. Christy. 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 Biopsies were obtained with EUS and ERCP. A plastic biliary stent was placed. Will await path and repeat ERCP in 2 months to exchange the stent. Follow up with GI and oncology.    Repeat liver enzymes tomorrow and if improving can go home.  Pt denies any abdominal pain, nausea or vomiting after procedure.      5/30- looks and feels so much better, no pain, comfortable, no NV, jaundice much improved, T Bili down from 23 to 14, Alk Phos down from 17 to 14, AST, ALT also coming down. Pt eating drinking well, walking around well. GI and Onc evaluated n cleared and she and her daughter who is a Paediatric Nurse are excited about going home. She was seen and examined and deemed stable for discharge home today.

## 2022-05-29 NOTE — ANESTHESIA PREPROCEDURE EVALUATION
05/28/2022  Ann Marie Correa is a 82 y.o., female.      Pre-op Assessment    I have reviewed the Patient Summary Reports.     I have reviewed the Nursing Notes. I have reviewed the NPO Status.   I have reviewed the Medications.     Review of Systems  Anesthesia Hx:  No problems with previous Anesthesia  Denies Family Hx of Anesthesia complications.   Denies Personal Hx of Anesthesia complications.   Social:  Smoker    Hematology/Oncology:  Hematology Normal   Oncology Normal     EENT/Dental:EENT/Dental Normal   Cardiovascular:   Hypertension    Pulmonary:  Pulmonary Normal    Renal/:  Renal/ Normal     Hepatic/GI:   Biliary and pancreatic duct obstruction with elevated LFT's and Amylase   Musculoskeletal:  Musculoskeletal Normal    Neurological:  Neurology Normal    Endocrine:  Endocrine Normal    Dermatological:  Skin Normal    Psych:  Psychiatric Normal           Physical Exam  General: Alert and Oriented    Airway:  Mallampati: II   Mouth Opening: Normal  TM Distance: Normal  Tongue: Normal  Neck ROM: Normal ROM    Dental:  Dentures    Chest/Lungs:  Clear to auscultation, Normal Respiratory Rate    Heart:  Rate: Normal  Rhythm: Regular Rhythm    Skin:jaundice      Anesthesia Plan  Type of Anesthesia, risks & benefits discussed:    Anesthesia Type: Gen ETT  Intra-op Monitoring Plan: Standard ASA Monitors  Induction:  IV  Informed Consent: Informed consent signed with the Patient and all parties understand the risks and agree with anesthesia plan.  All questions answered.   ASA Score: 3  Day of Surgery Review of History & Physical: H&P Update referred to the surgeon/provider.    Ready For Surgery From Anesthesia Perspective.     .

## 2022-05-29 NOTE — ANESTHESIA PROCEDURE NOTES
Intubation    Date/Time: 5/29/2022 8:26 AM  Performed by: Catrachito Lucero CRNA  Authorized by: Shar Rodriguez MD     Intubation:     Induction:  Intravenous    Intubated:  Postinduction    Mask Ventilation:  Easy mask    Attempts:  1    Attempted By:  CRNA    Method of Intubation:  Direct    Blade:  Walden 2    Laryngeal View Grade: Grade I - full view of cords      Difficult Airway Encountered?: No      Complications:  None    Airway Device:  Oral endotracheal tube    Airway Device Size:  7.5    Style/Cuff Inflation:  Cuffed (inflated to minimal occlusive pressure)    Tube secured:  21    Secured at:  The lips    Placement Verified By:  Capnometry    Complicating Factors:  None    Findings Post-Intubation:  BS equal bilateral and atraumatic/condition of teeth unchanged

## 2022-05-29 NOTE — ASSESSMENT & PLAN NOTE
EUS and ERCP completed. 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 Biopsies were obtained with EUS and ERCP. A plastic biliary stent was placed. Will await path and repeat ERCP in 2 months to exchange the stent. Follow up with GI and oncology.      Repeat liver enzymes tomorrow and if improving can go home.

## 2022-05-30 VITALS
RESPIRATION RATE: 18 BRPM | WEIGHT: 84.19 LBS | DIASTOLIC BLOOD PRESSURE: 75 MMHG | SYSTOLIC BLOOD PRESSURE: 169 MMHG | TEMPERATURE: 99 F | HEART RATE: 79 BPM | HEIGHT: 64 IN | OXYGEN SATURATION: 98 % | BODY MASS INDEX: 14.37 KG/M2

## 2022-05-30 PROBLEM — R17 JAUNDICE: Status: ACTIVE | Noted: 2022-05-30

## 2022-05-30 PROBLEM — R74.8 ELEVATED LIVER ENZYMES: Status: RESOLVED | Noted: 2022-05-28 | Resolved: 2022-05-30

## 2022-05-30 PROBLEM — R53.1 WEAKNESS: Status: RESOLVED | Noted: 2022-05-30 | Resolved: 2022-05-30

## 2022-05-30 PROBLEM — R17 JAUNDICE: Status: RESOLVED | Noted: 2022-05-30 | Resolved: 2022-05-30

## 2022-05-30 PROBLEM — K83.1 OBSTRUCTION OF BILE DUCT: Status: RESOLVED | Noted: 2022-05-28 | Resolved: 2022-05-30

## 2022-05-30 PROBLEM — R63.4 WEIGHT LOSS: Status: ACTIVE | Noted: 2022-05-30

## 2022-05-30 PROBLEM — R53.1 WEAKNESS: Status: ACTIVE | Noted: 2022-05-30

## 2022-05-30 LAB
ALBUMIN SERPL BCP-MCNC: 2.3 G/DL (ref 3.5–5.2)
ALP SERPL-CCNC: 1385 U/L (ref 55–135)
ALT SERPL W/O P-5'-P-CCNC: 277 U/L (ref 10–44)
ANION GAP SERPL CALC-SCNC: 10 MMOL/L (ref 8–16)
AST SERPL-CCNC: 146 U/L (ref 10–40)
BASOPHILS # BLD AUTO: 0.03 K/UL (ref 0–0.2)
BASOPHILS NFR BLD: 0.4 % (ref 0–1.9)
BILIRUB SERPL-MCNC: 9.1 MG/DL (ref 0.1–1)
BUN SERPL-MCNC: 9 MG/DL (ref 8–23)
CALCIUM SERPL-MCNC: 8.6 MG/DL (ref 8.7–10.5)
CANCER AG19-9 SERPL-ACNC: 1304.3 U/ML (ref 0–40)
CHLORIDE SERPL-SCNC: 100 MMOL/L (ref 95–110)
CO2 SERPL-SCNC: 25 MMOL/L (ref 23–29)
CREAT SERPL-MCNC: 0.7 MG/DL (ref 0.5–1.4)
DIFFERENTIAL METHOD: ABNORMAL
EOSINOPHIL # BLD AUTO: 0.1 K/UL (ref 0–0.5)
EOSINOPHIL NFR BLD: 1.1 % (ref 0–8)
ERYTHROCYTE [DISTWIDTH] IN BLOOD BY AUTOMATED COUNT: 17.4 % (ref 11.5–14.5)
EST. GFR  (AFRICAN AMERICAN): >60 ML/MIN/1.73 M^2
EST. GFR  (NON AFRICAN AMERICAN): >60 ML/MIN/1.73 M^2
GLUCOSE SERPL-MCNC: 94 MG/DL (ref 70–110)
HAV IGM SERPL QL IA: NEGATIVE
HBV CORE IGM SERPL QL IA: NEGATIVE
HBV SURFACE AG SERPL QL IA: NEGATIVE
HCT VFR BLD AUTO: 31.6 % (ref 37–48.5)
HCV AB SERPL QL IA: NEGATIVE
HGB BLD-MCNC: 10.6 G/DL (ref 12–16)
IMM GRANULOCYTES # BLD AUTO: 0.07 K/UL (ref 0–0.04)
IMM GRANULOCYTES NFR BLD AUTO: 0.8 % (ref 0–0.5)
LYMPHOCYTES # BLD AUTO: 0.9 K/UL (ref 1–4.8)
LYMPHOCYTES NFR BLD: 11.2 % (ref 18–48)
MCH RBC QN AUTO: 30.6 PG (ref 27–31)
MCHC RBC AUTO-ENTMCNC: 33.5 G/DL (ref 32–36)
MCV RBC AUTO: 91 FL (ref 82–98)
MONOCYTES # BLD AUTO: 0.6 K/UL (ref 0.3–1)
MONOCYTES NFR BLD: 7.8 % (ref 4–15)
NEUTROPHILS # BLD AUTO: 6.5 K/UL (ref 1.8–7.7)
NEUTROPHILS NFR BLD: 78.7 % (ref 38–73)
NRBC BLD-RTO: 0 /100 WBC
PLATELET # BLD AUTO: 345 K/UL (ref 150–450)
PMV BLD AUTO: 12.3 FL (ref 9.2–12.9)
POTASSIUM SERPL-SCNC: 3.9 MMOL/L (ref 3.5–5.1)
PROT SERPL-MCNC: 5.4 G/DL (ref 6–8.4)
RBC # BLD AUTO: 3.46 M/UL (ref 4–5.4)
SODIUM SERPL-SCNC: 135 MMOL/L (ref 136–145)
WBC # BLD AUTO: 8.25 K/UL (ref 3.9–12.7)

## 2022-05-30 PROCEDURE — S4991 NICOTINE PATCH NONLEGEND: HCPCS | Performed by: FAMILY MEDICINE

## 2022-05-30 PROCEDURE — A4216 STERILE WATER/SALINE, 10 ML: HCPCS | Performed by: FAMILY MEDICINE

## 2022-05-30 PROCEDURE — 36415 COLL VENOUS BLD VENIPUNCTURE: CPT | Performed by: FAMILY MEDICINE

## 2022-05-30 PROCEDURE — 25000003 PHARM REV CODE 250: Performed by: NURSE PRACTITIONER

## 2022-05-30 PROCEDURE — 80053 COMPREHEN METABOLIC PANEL: CPT | Performed by: FAMILY MEDICINE

## 2022-05-30 PROCEDURE — 85025 COMPLETE CBC W/AUTO DIFF WBC: CPT | Performed by: FAMILY MEDICINE

## 2022-05-30 PROCEDURE — 25000003 PHARM REV CODE 250: Performed by: FAMILY MEDICINE

## 2022-05-30 PROCEDURE — 63600175 PHARM REV CODE 636 W HCPCS: Performed by: FAMILY MEDICINE

## 2022-05-30 RX ORDER — LANOLIN ALCOHOL/MO/W.PET/CERES
100 CREAM (GRAM) TOPICAL DAILY
Qty: 60 TABLET | Refills: 0 | Status: SHIPPED | OUTPATIENT
Start: 2022-05-30

## 2022-05-30 RX ORDER — POLYETHYLENE GLYCOL 3350 17 G/17G
17 POWDER, FOR SOLUTION ORAL DAILY
Qty: 510 G | Refills: 2 | Status: SHIPPED | OUTPATIENT
Start: 2022-05-31

## 2022-05-30 RX ORDER — METOPROLOL SUCCINATE 25 MG/1
25 TABLET, EXTENDED RELEASE ORAL DAILY
Qty: 30 TABLET | Refills: 11 | Status: SHIPPED | OUTPATIENT
Start: 2022-05-31 | End: 2023-05-31

## 2022-05-30 RX ORDER — MULTIVIT,CALC,MINS/IRON/FOLIC 9MG-400MCG
1 TABLET ORAL DAILY
Qty: 60 TABLET | Refills: 0 | Status: SHIPPED | OUTPATIENT
Start: 2022-05-30

## 2022-05-30 RX ORDER — NICOTINE 7MG/24HR
1 PATCH, TRANSDERMAL 24 HOURS TRANSDERMAL DAILY
Qty: 28 PATCH | Refills: 1 | Status: SHIPPED | OUTPATIENT
Start: 2022-05-31

## 2022-05-30 RX ADMIN — HEPARIN SODIUM 5000 UNITS: 5000 INJECTION INTRAVENOUS; SUBCUTANEOUS at 06:05

## 2022-05-30 RX ADMIN — NICOTINE 1 PATCH: 7 PATCH TRANSDERMAL at 10:05

## 2022-05-30 RX ADMIN — Medication 10 ML: at 12:05

## 2022-05-30 RX ADMIN — METOPROLOL SUCCINATE 25 MG: 25 TABLET, EXTENDED RELEASE ORAL at 10:05

## 2022-05-30 RX ADMIN — HYDRALAZINE HYDROCHLORIDE 10 MG: 20 INJECTION, SOLUTION INTRAMUSCULAR; INTRAVENOUS at 12:05

## 2022-05-30 NOTE — PLAN OF CARE
[facility] - Med Surg  Discharge Final Note    Primary Care Provider: Marc Aldridge MD    Expected Discharge Date: 5/30/2022    Final Discharge Note (most recent)     Final Note - 05/30/22 1406        Final Note    Assessment Type Final Discharge Note     Anticipated Discharge Disposition Home or Self Care     Hospital Resources/Appts/Education Provided Appointments scheduled and added to AVS                 Important Message from Medicare  Important Message from Medicare regarding Discharge Appeal Rights: Given to patient/caregiver, Explained to patient/caregiver, Signed/date by patient/caregiver     Date IMM was signed: 05/30/22  Time IMM was signed: 1231    Contact Info     Betito Mooney PA-C   Specialty: Gastroenterology    [address] [facility]  [place] [address]   Phone: [phone]       Next Steps: Schedule an appointment as soon as possible for a visit in 1 week(s)    Instructions: Hospital follow up for obrstructive jaundice/ pancreatic mass s/p Biliary stent and EUS    Dannielle Solis NP   Specialty: Hematology and Oncology    [address] [place] [address] [address] [place] [address]   Phone: [phone]       Next Steps: Follow up in 1 week(s)    Instructions: Hospital follow up for pancreatic mass Obstructive jaundice/ s/p RCP with stent    Dannielle Solis RN        Next Steps: Follow up    Chloe Hampton NP   Specialty: Family Medicine    [address] [facility] [address]   Phone: [phone]       Next Steps: Go on 6/6/2022    Instructions: at 9:40 am for hospital follow up.  Bring ID and insurance card to appointment

## 2022-05-30 NOTE — PLAN OF CARE
[facility]  Initial Discharge Assessment       Primary Care Provider: Dr Oly Ball  Admission Diagnosis: Tobacco abuse [Z72.0]  Weakness [R53.1]  Jaundice [R17]  Weight loss [R63.4]  Abdominal mass, unspecified abdominal location [R19.00]    Admission Date: 5/28/2022  Expected Discharge Date:     Discharge Barriers Identified: None    Payor: MEDICARE / Plan: MEDICARE PART A & B / Product Type: Government /     Extended Emergency Contact Information  Primary Emergency Contact: BettyamandaproKita/ Daughter   [place]  Mobile Phone: [phone]  Relation: Daughter    Discharge Plan A: Home  Discharge Plan B: Home    No Pharmacies Listed    Initial Assessment (most recent)     Adult Discharge Assessment - 05/30/22 1036        Discharge Assessment    Assessment Type Discharge Planning Assessment     Confirmed/corrected address, phone number and insurance Yes     Confirmed Demographics Correct on Facesheet     Source of Information patient;family     Communicated SARAH with patient/caregiver Yes     Reason For Admission Jaundice     Lives With other (see comments)   room mate    Facility Arrived From: Home     Do you expect to return to your current living situation? Yes     Do you have help at home or someone to help you manage your care at home? No     Prior to hospitilization cognitive status: Alert/Oriented     Current cognitive status: Alert/Oriented     Walking or Climbing Stairs Difficulty none     Dressing/Bathing Difficulty none     Home Accessibility stairs to enter home     Number of Stairs, Main Entrance four     Equipment Currently Used at Home none     Readmission within 30 days? No     Patient currently being followed by outpatient case management? No     Do you currently have service(s) that help you manage your care at home? No     Do you take prescription medications? Yes     Do you have prescription coverage? Yes     Do you have any problems affording any of your  prescribed medications? No     Is the patient taking medications as prescribed? yes     Who is going to help you get home at discharge? Patient is independent     How do you get to doctors appointments? car, drives self     Are you on dialysis? No     Do you take coumadin? No     Discharge Plan A Home     Discharge Plan B Home     DME Needed Upon Discharge  none     Discharge Plan discussed with: Patient     Discharge Barriers Identified None               CM met with patient/daughter at the bedside to assess for discharge needs.  Patient is independent with all needs and does not use any medical equipment.  Patient does not have any anticipated discharge needs at this time.  CM provided a transitional care folder, information on advanced directives, information on pharmacy bedside delivery, and discharge planning begins on admission with contact information for any needs/questions.

## 2022-05-31 ENCOUNTER — TELEPHONE (OUTPATIENT)
Dept: HEMATOLOGY/ONCOLOGY | Facility: CLINIC | Age: 82
End: 2022-05-31
Payer: MEDICARE

## 2022-05-31 NOTE — DISCHARGE SUMMARY
[facility] Medicine  Discharge Summary      Patient Name: Ann Marie Correa  MRN: [id number]  Patient Class: IP- Inpatient  Admission Date: 5/28/2022  Hospital Length of Stay: 1 days  Discharge Date and Time:  05/31/2022 12:33 PM  Attending Physician: No att. providers found   Discharging Provider: Uli Chowdary MD  Primary Care Provider: Marc Aldridge MD      HPI:   82F PMH hypertension, who presented to emergency department in [facility] by daughter who is a nurse with concerns for jaundice x 1w. Infrequently seeks medical care, last known checkup or labs were about a year ago. Symptoms now include weight loss of about 10% of her previous body weight over an uncertain period of time, decreased appetite, increase in chronic skin problems in the form of itching. Denies chest pain, dyspnea, dysphagia, fever, abdominal pain, any sign of bleeding, dyspnea, urinary complaints, or any other problems.      SH etoh (2-3 beers, socially), smoker (1ppd since 17yo); denies illicit drug use; cares for elderly  PSH cataract extraction  [facility] maternal breast cancer    Initial workup in emergency department with hypertension, cbc unremarkable, cmp with elevated alk phos, tbili, liver enzymes, amylase and lipase. Etoh serum within normal limits, urinalysis with bilirubin. Ct c/abdomen/p with duct dilatation and obstruction. Chest x-ray with increased density in right lobe.    Gastroenterology recommended hospitalization for eus and ercp tomorrow.    Hospital medicine has been consulted for admission under observation.      Procedure(s) (LRB):  ERCP (ENDOSCOPIC RETROGRADE CHOLANGIOPANCREATOGRAPHY) (N/A)  ULTRASOUND, UPPER GI TRACT, ENDOSCOPIC (N/A)      Hospital Course:   Pt underwent a EUS and ERCP by Dr. Christy. 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 Biopsies were obtained with EUS and ERCP. A plastic biliary stent was placed. Will await path and repeat ERCP in 2 months to  exchange the stent. Follow up with GI and oncology.    Repeat liver enzymes tomorrow and if improving can go home.  Pt denies any abdominal pain, nausea or vomiting after procedure.      5/30- looks and feels so much better, no pain, comfortable, no NV, jaundice much improved, T Bili down from 23 to 14, Alk Phos down from 17 to 14, AST, ALT also coming down. Pt eating drinking well, walking around well. GI and Onc evaluated n cleared and she and her daughter who is a Paediatric Nurse are excited about going home. She was seen and examined and deemed stable for discharge home today.        Goals of Care Treatment Preferences:  Code Status: Full Code      Consults:   Consults (From admission, onward)        Status Ordering Provider     Inpatient consult to Gastroenterology  Once        Provider:  (Not yet assigned)    KYLIE Soria     Inpatient consult to Gastroenterology  Once        Provider:  Gurdeep Rubio MD    Completed TATUM TENORIO          No new Assessment & Plan notes have been filed under this hospital service since the last note was generated.  Service: Hospital Medicine    Final Active Diagnoses:    Diagnosis Date Noted POA    PRINCIPAL PROBLEM:  Pancreatic mass [K86.89] 05/29/2022 Yes    Weight loss [R63.4] 05/30/2022 Yes    HTN (hypertension) [I10] 05/28/2022 Yes    Abnormal CXR [R93.89] 05/28/2022 Yes    Tobacco use disorder [F17.200] 05/28/2022 Yes      Problems Resolved During this Admission:    Diagnosis Date Noted Date Resolved POA    Weakness [R53.1] 05/30/2022 05/30/2022 Yes    Jaundice [R17] 05/30/2022 05/30/2022 Yes    Obstruction of bile duct [K83.1] 05/28/2022 05/30/2022 Yes    Elevated liver enzymes [R74.8] 05/28/2022 05/30/2022 Yes       Discharged Condition: stable    Disposition: Home or Self Care    Follow Up:   Follow-up Information     Betito Mooney PA-C. Schedule an appointment as soon as possible for a visit in 1 week(s).    Specialty:  Gastroenterology  Why: Hospital follow up for obrstructive jaundice/ pancreatic mass s/p Biliary stent and EUS  Contact information:  [address]  [place] [address]  [phone]             Dannielle Solis NP Follow up in 1 week(s).    Specialty: Hematology and Oncology  Why: Hospital follow up for pancreatic mass Obstructive jaundice/ s/p RCP with stent  Contact information:  [id number] [place] [address] [address] [place] [address]  [phone]             Dannielle Solis RN .           Chloe G Memo, NP. Go on 6/6/2022.    Specialty: Family Medicine  Why: at 9:40 am for hospital follow up.  Bring ID and insurance card to appointment  Contact information:  [address] [facility] [address]  [phone]                       Patient Instructions:      Diet Adult Regular     Activity as tolerated       Significant Diagnostic Studies: Labs:   BMP:   Recent Labs   Lab 05/30/22  0732   GLU 94   *   K 3.9      CO2 25   BUN 9   CREATININE 0.7   CALCIUM 8.6*   , CMP   Recent Labs   Lab 05/30/22  0732   *   K 3.9      CO2 25   GLU 94   BUN 9   CREATININE 0.7   CALCIUM 8.6*   PROT 5.4*   ALBUMIN 2.3*   BILITOT 9.1*   ALKPHOS 1,385*   *   *   ANIONGAP 10   ESTGFRAFRICA >60   EGFRNONAA >60   , CBC   Recent Labs   Lab 05/30/22  0732   WBC 8.25   HGB 10.6*   HCT 31.6*       and All labs within the past 24 hours have been reviewed  Radiology:   Imaging Results          CT Chest Abdomen Pelvis Without Contrast (XPD) (Final result)  Result time 05/28/22 08:30:03    Final result by Yesika Nieves MD (Timothy) (05/28/22 08:30:03)                 Impression:      Marked dilatation of the bile ducts and pancreatic ducts.  Obstruction of the distal common bile duct is suspected.  Etiology of obstruction is is indeterminate from this evaluation.  Further evaluation for obstructing stone occult mass involving the pancreatic head and uncinate process must be performed.   Consider ultrasound and contrast CT of the abdomen and pelvis and or MRI.    All CT scans at this facility use dose modulation, iterative reconstructions, and/or weight base dosing when appropriate to reduce radiation dose to as low as reasonably achievable      Electronically signed by: Yesika Nieves MD  Date:    05/28/2022  Time:    08:30             Narrative:    EXAMINATION:  CT CHEST ABDOMEN PELVIS WITHOUT CONTRAST(XPD)    CLINICAL HISTORY:  New onset jaundice suspect malignancy;    COMPARISON:  None    FINDINGS:  Chest: Moderate emphysema is present.  No focal lung infiltrates.  No consolidation.  No suspicious masses.  No pleural effusions.  Heart is borderline enlarged.  No pericardial effusion.    Skeletal structures are intact.    Abdomen pelvis:    No definite focal liver masses.  However there is marked intrahepatic as well as extrahepatic bile duct dilatation.  Common bile duct measures 1.2 cm.  There also appears to be dilatation of the pancreatic duct measuring 1.8 cm.  The gallbladder is distended as well.  No focal abnormalities in the spleen.  Pancreas is difficult to evaluate for mass without IV or oral contrast.  Occult mass involving the pancreatic head and uncinate process cannot be excluded given bile duct and pancreatic ductal dilatation.    Right kidney appears unremarkable.  Small atrophic left kidney with a small 1.5 cm renal cyst.  Fusiform aneurysm of the abdominal aorta diameter of 4.4 x 3.6 cm no retroperitoneal hematoma.    There are no acute bowel abnormalities.     No evidence of appendicitis.  No evidence of diverticulitis.    Bladder is normal.Small amount of free fluid in the pelvis.    Skeletal structures are intact.  No acute skeletal findings.                               X-Ray Chest PA And Lateral (Final result)  Result time 05/28/22 07:44:07    Final result by Bryn Lindsay MD (05/28/22 07:44:07)                 Impression:      1.  Increased density in the medial right  lung base concerning for possible infiltrate.  If the patient is having symptoms of pneumonia, then treatment for presumed pneumonia and follow-up x-rays 4-6 weeks recommended.  If the patient is not having symptoms of pneumonia, however, further evaluation with a chest CT scan may be helpful.  Other any old studies ectomy made available for comparison?    2.  Incidental findings as noted above.  Negative for acute process otherwise.      Electronically signed by: Bryn Lindsay MD  Date:    05/28/2022  Time:    07:44             Narrative:    EXAMINATION:  XR CHEST PA AND LATERAL    CLINICAL HISTORY:  Chest Pain;    COMPARISON:  No comparison studies are available.    FINDINGS:  EKG leads overlie the chest which is rotated to the left.  Hyperinflation.  There is an area of increased density in the medial right lung base.  The lungs are otherwise clear.  The cardiac silhouette size is normal. The trachea is midline and the mediastinal width is normal. Negative for focal infiltrate, effusion or pneumothorax. Pulmonary vasculature is normal. Negative for osseous abnormalities. Hiatal hernia.  Ectatic and tortuous aorta with aortic arch calcifications.  Degenerative changes of the spine and both shoulder girdles.                                  Pending Diagnostic Studies:     Procedure Component Value Units Date/Time    Comprehensive Metabolic Panel (CMP) [[id number]] Collected: 05/29/22 0638    Order Status: Sent Lab Status: In process Updated: 05/29/22 0638    Specimen: Blood     Cytology-FNA Non-Radiology Clinician Performed w/o on site [[id number]] Collected: 05/29/22 0926    Order Status: Sent Lab Status: In process Updated: 05/29/22 1607    Specimen to Pathology, Surgery Gastrointestinal tract [[id number]] Collected: 05/29/22 0926    Order Status: Sent Lab Status: In process Updated: 05/29/22 1606    Specimen: Tissue          Medications:  Reconciled Home Medications:      Medication List      START taking these  medications    HIGH POTENCY MULTIVIT (W-IRON) 9 mg iron-400 mcg Tab tablet  Generic drug: multivit-iron-FA-calcium-mins  Take 1 tablet by mouth once daily.     nicotine 7 mg/24 hr  Commonly known as: NICODERM CQ  Place 1 patch onto the skin once daily.     NIVA-FOL 2.5-25-2 mg Tab  Generic drug: folic acid-vit B6-vit B12 2.5-25-2 mg  Take 1 tablet by mouth once daily.     polyethylene glycol 17 gram/dose powder  Commonly known as: GLYCOLAX  Take 17 g by mouth once daily.     thiamine 100 MG tablet  Take 1 tablet (100 mg total) by mouth once daily.        CHANGE how you take these medications    metoprolol succinate 25 MG 24 hr tablet  Commonly known as: TOPROL-XL  Take 1 tablet (25 mg total) by mouth once daily.  What changed: See the new instructions.        CONTINUE taking these medications    hydrOXYzine 50 MG tablet  Commonly known as: ATARAX  Take 1 tablet (50 mg total) by mouth 3 (three) times daily as needed (Allergic rash/itching).     promethazine 12.5 MG Tab  Commonly known as: PHENERGAN  Take 1 tablet (12.5 mg total) by mouth every 6 (six) hours as needed for Nausea.            Indwelling Lines/Drains at time of discharge:   Lines/Drains/Airways     None                 Time spent on the discharge of patient: 41 minutes         Uli Chowdary MD  Department of Hospital Medicine  [facility]

## 2022-05-31 NOTE — TELEPHONE ENCOUNTER
Spoke with patient's daughter and let her know that someone will get in touch with her to schedule after biopsy's are resulted.    Tried to contact daughter back to let her know that went ahead and scheduled patients appt in case MD's schedule fills up.  6/9/22.  Unable to reach daughter but will send appt via mail.

## 2022-06-02 LAB
BACTERIA BLD CULT: NORMAL
BACTERIA BLD CULT: NORMAL

## 2022-06-03 ENCOUNTER — TELEPHONE (OUTPATIENT)
Dept: GASTROENTEROLOGY | Facility: CLINIC | Age: 82
End: 2022-06-03
Payer: MEDICARE

## 2022-06-03 ENCOUNTER — PATIENT MESSAGE (OUTPATIENT)
Dept: GASTROENTEROLOGY | Facility: CLINIC | Age: 82
End: 2022-06-03
Payer: MEDICARE

## 2022-06-03 NOTE — TELEPHONE ENCOUNTER
Notified pts daughter, Kita, advised her that Ms Mooney reviewed her moms chart and her appt for 06/14/22 will be fine. Keep that appt to discuss follow up scope with Dr Richmond. She agreed to plan and verbalized understanding.

## 2022-06-03 NOTE — TELEPHONE ENCOUNTER
Spoke to pts daughter, Kita. She states pt has a procedure done and was advised to follow up with Ms Mooney in one week.   Appt scheduled for first available on 06/14/22. Pathology report still pending.   Appt with Dr Kamara scheduled for 06/10/22.    Is the follow up appt on 06/14/22 ok. Or does she need to be seen sooner?

## 2022-06-03 NOTE — TELEPHONE ENCOUNTER
The 14th is ok. My visit is mostly to see about setting up a follow up scope for Dr. Marie. Her pathology will likely come back early next week at which time he will reach to them with results. If the plan needs to change after that comes back, then we can do so. Please just reassure her.   Thanks,  Betito

## 2022-06-03 NOTE — TELEPHONE ENCOUNTER
----- Message from Nicole Sood sent at 6/3/2022 10:30 AM CDT -----  Contact: Mireya daughter [phone]  1MEDICALADVICE     Patient is calling for Medical Advice regarding:    How long has patient had these symptoms:    Pharmacy name and phone#:    Would like response via inDplayt:    Comments: Pt's daughter is requesting a call back from the nurse to schedule a f/u. Pt had an EDG and was told to f/u with PLACIDO Mooney, but there is nothing available and pt needs to be seen next week

## 2022-06-06 ENCOUNTER — PATIENT MESSAGE (OUTPATIENT)
Dept: GASTROENTEROLOGY | Facility: CLINIC | Age: 82
End: 2022-06-06
Payer: MEDICARE

## 2022-06-06 ENCOUNTER — TELEPHONE (OUTPATIENT)
Dept: GASTROENTEROLOGY | Facility: HOSPITAL | Age: 82
End: 2022-06-06
Payer: MEDICARE

## 2022-06-06 LAB
COMMENT: ABNORMAL
FINAL PATHOLOGIC DIAGNOSIS: ABNORMAL
FINAL PATHOLOGIC DIAGNOSIS: NORMAL
GROSS: NORMAL
Lab: NORMAL
MICROSCOPIC EXAM: NORMAL

## 2022-06-06 NOTE — TELEPHONE ENCOUNTER
Pt's daughter informed about biopsy results showing adenocarcinoma of the pancreas. She has an appt with Dr. Kamara in oncology clinic.     Will arrange for repeat ERCP to place a metal stent.     Pilar Marie MD  Gastroenterology  Director of Advanced Endoscopy at [facility]

## 2022-06-07 ENCOUNTER — TELEPHONE (OUTPATIENT)
Dept: GASTROENTEROLOGY | Facility: CLINIC | Age: 82
End: 2022-06-07
Payer: MEDICARE

## 2022-06-07 NOTE — TELEPHONE ENCOUNTER
Phoned patient and spoke with daughter Kita.  Scheduled ERCP on 7.1.22.  Will send instructions via [employer] message.

## 2022-06-07 NOTE — TELEPHONE ENCOUNTER
----- Message from Pilar Marie MD sent at 6/6/2022  2:24 PM CDT -----  Please schedule ERCP to exchange her stent in Mid July.     Pilar Marie MD  Gastroenterology  Director of Advanced Endoscopy at [facility]

## 2022-06-07 NOTE — TELEPHONE ENCOUNTER
----- Message from Pilar Marie MD sent at 5/29/2022 10:30 AM CDT -----  Please schedule an ERCP to exchange the stent in two months    -S

## 2022-06-09 NOTE — PHYSICIAN QUERY
PT Name: Ann Marie Correa  MR #: [id number]    DOCUMENTATION CLARIFICATION     CDS/: Wanda Avila RN, CDS               Contact information: [email or web address]    This form is a permanent document in the medical record.     Query Date: June 9, 2022    By submitting this query, we are merely seeking further clarification of documentation.  Please utilize your independent clinical judgment when addressing the question(s) below.    The medical record contains the following:  Pathology Findings Location in Medical Record   1.  Pancreatic head mass, fine needle aspiration (4 smears, 1 cell block):   Positive for malignancy.  Adenocarcinoma. Path Report      Collected: 5/29      Resulted: 6/6        Please clarify:  [ xx ] Pathology findings noted above are ruled in/confirmed as diagnoses   [  ] Pathology findings noted above are not confirmed as diagnoses   [  ] Other diagnosis (please specify): ___________   [  ] Clinically Undetermined       Please document in your progress notes daily for the duration of treatment until resolved and include in your discharge summary.    Form No. [id number]

## 2022-06-10 ENCOUNTER — OFFICE VISIT (OUTPATIENT)
Dept: HEMATOLOGY/ONCOLOGY | Facility: CLINIC | Age: 82
End: 2022-06-10
Payer: MEDICARE

## 2022-06-10 ENCOUNTER — PATIENT MESSAGE (OUTPATIENT)
Dept: HEMATOLOGY/ONCOLOGY | Facility: CLINIC | Age: 82
End: 2022-06-10

## 2022-06-10 ENCOUNTER — LAB VISIT (OUTPATIENT)
Dept: LAB | Facility: HOSPITAL | Age: 82
End: 2022-06-10
Attending: INTERNAL MEDICINE
Payer: MEDICARE

## 2022-06-10 VITALS
OXYGEN SATURATION: 100 % | BODY MASS INDEX: 14 KG/M2 | SYSTOLIC BLOOD PRESSURE: 172 MMHG | WEIGHT: 82 LBS | DIASTOLIC BLOOD PRESSURE: 92 MMHG | RESPIRATION RATE: 20 BRPM | HEART RATE: 71 BPM | TEMPERATURE: 98 F | HEIGHT: 64 IN

## 2022-06-10 DIAGNOSIS — R63.4 WEIGHT LOSS: ICD-10-CM

## 2022-06-10 DIAGNOSIS — C25.0 MALIGNANT NEOPLASM OF HEAD OF PANCREAS: ICD-10-CM

## 2022-06-10 DIAGNOSIS — I10 PRIMARY HYPERTENSION: ICD-10-CM

## 2022-06-10 DIAGNOSIS — I71.40 ABDOMINAL AORTIC ANEURYSM (AAA) GREATER THAN 5.0 CM IN DIAMETER IN FEMALE: ICD-10-CM

## 2022-06-10 DIAGNOSIS — R64 CACHEXIA: ICD-10-CM

## 2022-06-10 DIAGNOSIS — C25.0 MALIGNANT NEOPLASM OF HEAD OF PANCREAS: Primary | ICD-10-CM

## 2022-06-10 DIAGNOSIS — F17.200 TOBACCO USE DISORDER: ICD-10-CM

## 2022-06-10 PROBLEM — K86.89 PANCREATIC MASS: Status: RESOLVED | Noted: 2022-05-29 | Resolved: 2022-06-10

## 2022-06-10 LAB
ALBUMIN SERPL BCP-MCNC: 3.2 G/DL (ref 3.5–5.2)
ALP SERPL-CCNC: 498 U/L (ref 55–135)
ALT SERPL W/O P-5'-P-CCNC: 105 U/L (ref 10–44)
ANION GAP SERPL CALC-SCNC: 9 MMOL/L (ref 8–16)
AST SERPL-CCNC: 65 U/L (ref 10–40)
BASOPHILS # BLD AUTO: 0.07 K/UL (ref 0–0.2)
BASOPHILS NFR BLD: 0.8 % (ref 0–1.9)
BILIRUB SERPL-MCNC: 4 MG/DL (ref 0.1–1)
BUN SERPL-MCNC: 19 MG/DL (ref 8–23)
CALCIUM SERPL-MCNC: 9.5 MG/DL (ref 8.7–10.5)
CANCER AG19-9 SERPL-ACNC: 627.1 U/ML (ref 0–40)
CHLORIDE SERPL-SCNC: 102 MMOL/L (ref 95–110)
CO2 SERPL-SCNC: 28 MMOL/L (ref 23–29)
CREAT SERPL-MCNC: 0.8 MG/DL (ref 0.5–1.4)
DIFFERENTIAL METHOD: ABNORMAL
EOSINOPHIL # BLD AUTO: 0.1 K/UL (ref 0–0.5)
EOSINOPHIL NFR BLD: 1.3 % (ref 0–8)
ERYTHROCYTE [DISTWIDTH] IN BLOOD BY AUTOMATED COUNT: 15.6 % (ref 11.5–14.5)
EST. GFR  (AFRICAN AMERICAN): >60 ML/MIN/1.73 M^2
EST. GFR  (NON AFRICAN AMERICAN): >60 ML/MIN/1.73 M^2
GLUCOSE SERPL-MCNC: 99 MG/DL (ref 70–110)
HCT VFR BLD AUTO: 34.7 % (ref 37–48.5)
HGB BLD-MCNC: 11.5 G/DL (ref 12–16)
IMM GRANULOCYTES # BLD AUTO: 0.05 K/UL (ref 0–0.04)
IMM GRANULOCYTES NFR BLD AUTO: 0.5 % (ref 0–0.5)
LYMPHOCYTES # BLD AUTO: 1.3 K/UL (ref 1–4.8)
LYMPHOCYTES NFR BLD: 14.5 % (ref 18–48)
MCH RBC QN AUTO: 31.5 PG (ref 27–31)
MCHC RBC AUTO-ENTMCNC: 33.1 G/DL (ref 32–36)
MCV RBC AUTO: 95 FL (ref 82–98)
MONOCYTES # BLD AUTO: 0.7 K/UL (ref 0.3–1)
MONOCYTES NFR BLD: 7.4 % (ref 4–15)
NEUTROPHILS # BLD AUTO: 7 K/UL (ref 1.8–7.7)
NEUTROPHILS NFR BLD: 75.5 % (ref 38–73)
NRBC BLD-RTO: 0 /100 WBC
PLATELET # BLD AUTO: 496 K/UL (ref 150–450)
PMV BLD AUTO: 10.2 FL (ref 9.2–12.9)
POTASSIUM SERPL-SCNC: 4.7 MMOL/L (ref 3.5–5.1)
PROT SERPL-MCNC: 6.7 G/DL (ref 6–8.4)
RBC # BLD AUTO: 3.65 M/UL (ref 4–5.4)
SODIUM SERPL-SCNC: 139 MMOL/L (ref 136–145)
WBC # BLD AUTO: 9.22 K/UL (ref 3.9–12.7)

## 2022-06-10 PROCEDURE — 3080F PR MOST RECENT DIASTOLIC BLOOD PRESSURE >= 90 MM HG: ICD-10-PCS | Mod: CPTII,S$GLB,, | Performed by: INTERNAL MEDICINE

## 2022-06-10 PROCEDURE — 3288F FALL RISK ASSESSMENT DOCD: CPT | Mod: CPTII,S$GLB,, | Performed by: INTERNAL MEDICINE

## 2022-06-10 PROCEDURE — 1160F RVW MEDS BY RX/DR IN RCRD: CPT | Mod: CPTII,S$GLB,, | Performed by: INTERNAL MEDICINE

## 2022-06-10 PROCEDURE — 99205 OFFICE O/P NEW HI 60 MIN: CPT | Mod: S$GLB,,, | Performed by: INTERNAL MEDICINE

## 2022-06-10 PROCEDURE — 80053 COMPREHEN METABOLIC PANEL: CPT | Performed by: INTERNAL MEDICINE

## 2022-06-10 PROCEDURE — 3080F DIAST BP >= 90 MM HG: CPT | Mod: CPTII,S$GLB,, | Performed by: INTERNAL MEDICINE

## 2022-06-10 PROCEDURE — 1126F AMNT PAIN NOTED NONE PRSNT: CPT | Mod: CPTII,S$GLB,, | Performed by: INTERNAL MEDICINE

## 2022-06-10 PROCEDURE — 1111F DSCHRG MED/CURRENT MED MERGE: CPT | Mod: CPTII,S$GLB,, | Performed by: INTERNAL MEDICINE

## 2022-06-10 PROCEDURE — 86301 IMMUNOASSAY TUMOR CA 19-9: CPT | Performed by: INTERNAL MEDICINE

## 2022-06-10 PROCEDURE — 1159F MED LIST DOCD IN RCRD: CPT | Mod: CPTII,S$GLB,, | Performed by: INTERNAL MEDICINE

## 2022-06-10 PROCEDURE — 1101F PT FALLS ASSESS-DOCD LE1/YR: CPT | Mod: CPTII,S$GLB,, | Performed by: INTERNAL MEDICINE

## 2022-06-10 PROCEDURE — 85025 COMPLETE CBC W/AUTO DIFF WBC: CPT | Performed by: INTERNAL MEDICINE

## 2022-06-10 PROCEDURE — 99999 PR PBB SHADOW E&M-EST. PATIENT-LVL V: ICD-10-PCS | Mod: PBBFAC,,, | Performed by: INTERNAL MEDICINE

## 2022-06-10 PROCEDURE — 1101F PR PT FALLS ASSESS DOC 0-1 FALLS W/OUT INJ PAST YR: ICD-10-PCS | Mod: CPTII,S$GLB,, | Performed by: INTERNAL MEDICINE

## 2022-06-10 PROCEDURE — 3288F PR FALLS RISK ASSESSMENT DOCUMENTED: ICD-10-PCS | Mod: CPTII,S$GLB,, | Performed by: INTERNAL MEDICINE

## 2022-06-10 PROCEDURE — 99205 PR OFFICE/OUTPT VISIT, NEW, LEVL V, 60-74 MIN: ICD-10-PCS | Mod: S$GLB,,, | Performed by: INTERNAL MEDICINE

## 2022-06-10 PROCEDURE — 1159F PR MEDICATION LIST DOCUMENTED IN MEDICAL RECORD: ICD-10-PCS | Mod: CPTII,S$GLB,, | Performed by: INTERNAL MEDICINE

## 2022-06-10 PROCEDURE — 36415 COLL VENOUS BLD VENIPUNCTURE: CPT | Performed by: INTERNAL MEDICINE

## 2022-06-10 PROCEDURE — 99999 PR PBB SHADOW E&M-EST. PATIENT-LVL V: CPT | Mod: PBBFAC,,, | Performed by: INTERNAL MEDICINE

## 2022-06-10 PROCEDURE — 1111F PR DISCHARGE MEDS RECONCILED W/ CURRENT OUTPATIENT MED LIST: ICD-10-PCS | Mod: CPTII,S$GLB,, | Performed by: INTERNAL MEDICINE

## 2022-06-10 PROCEDURE — 1126F PR PAIN SEVERITY QUANTIFIED, NO PAIN PRESENT: ICD-10-PCS | Mod: CPTII,S$GLB,, | Performed by: INTERNAL MEDICINE

## 2022-06-10 PROCEDURE — 1160F PR REVIEW ALL MEDS BY PRESCRIBER/CLIN PHARMACIST DOCUMENTED: ICD-10-PCS | Mod: CPTII,S$GLB,, | Performed by: INTERNAL MEDICINE

## 2022-06-10 PROCEDURE — 3077F SYST BP >= 140 MM HG: CPT | Mod: CPTII,S$GLB,, | Performed by: INTERNAL MEDICINE

## 2022-06-10 PROCEDURE — 3077F PR MOST RECENT SYSTOLIC BLOOD PRESSURE >= 140 MM HG: ICD-10-PCS | Mod: CPTII,S$GLB,, | Performed by: INTERNAL MEDICINE

## 2022-06-10 NOTE — PROGRESS NOTES
Subjective:       Patient ID: Ann Marie Correa is a 82 y.o. female.    Chief Complaint: Results and Pancreatic Cancer    HPI    Past Medical History:   Diagnosis Date    Hypertension     Malignant neoplasm of head of pancreas 6/10/2022     History reviewed. No pertinent family history.  Social History     Socioeconomic History    Marital status:    Tobacco Use    Smoking status: Current Every Day Smoker     Packs/day: 1.00     Types: Cigarettes    Smokeless tobacco: Never Used   Substance and Sexual Activity    Alcohol use: Yes     Alcohol/week: 14.0 standard drinks     Types: 14 Cans of beer per week     Comment: socially    Drug use: No     Past Surgical History:   Procedure Laterality Date    CATARACT EXTRACTION  2021    ENDOSCOPIC ULTRASOUND OF UPPER GASTROINTESTINAL TRACT N/A 5/29/2022    Procedure: ULTRASOUND, UPPER GI TRACT, ENDOSCOPIC;  Surgeon: Pilar Marie MD;  Location: [facility];  Service: Endoscopy;  Laterality: N/A;    ERCP N/A 5/29/2022    Procedure: ERCP (ENDOSCOPIC RETROGRADE CHOLANGIOPANCREATOGRAPHY);  Surgeon: Pilar Marie MD;  Location: [facility];  Service: Endoscopy;  Laterality: N/A;       Labs:  Lab Results   Component Value Date    WBC 9.22 06/10/2022    HGB 11.5 (L) 06/10/2022    HCT 34.7 (L) 06/10/2022    MCV 95 06/10/2022     (H) 06/10/2022     BMP  Lab Results   Component Value Date     (L) 05/30/2022    K 3.9 05/30/2022     05/30/2022    CO2 25 05/30/2022    BUN 9 05/30/2022    CREATININE 0.7 05/30/2022    CALCIUM 8.6 (L) 05/30/2022    ANIONGAP 10 05/30/2022    ESTGFRAFRICA >60 05/30/2022    EGFRNONAA >60 05/30/2022     Lab Results   Component Value Date     (H) 05/30/2022     (H) 05/30/2022    ALKPHOS 1,385 (H) 05/30/2022    BILITOT 9.1 (H) 05/30/2022       No results found for: IRON, TIBC, FERRITIN, SATURATEDIRO  No results found for: AWSCOJVQ19  No results found for: FOLATE  No results found for:  TSH      Review of Systems    Objective:      Physical Exam        Assessment:      1. Malignant neoplasm of head of pancreas    2. Primary hypertension    3. Tobacco use disorder    4. Weight loss    5. Cachexia    6. Abdominal aortic aneurysm (AAA) greater than 5.0 cm in diameter in female           Plan:             Christian Kamara Jr, MD FACP

## 2022-06-10 NOTE — PROGRESS NOTES
Subjective:       Patient ID: Ann Marie Correa is a 82 y.o. female.    Chief Complaint: Results and Pancreatic Cancer    HPI 82-year-old female newly diagnosed with pancreatic mass consistent with adenocarcinoma MMR stable.  Patient is referred for further evaluation.  20 lb weight loss accompanied by granddaughter.  ECOG status 1    Past Medical History:   Diagnosis Date    Abdominal aortic aneurysm (AAA) greater than 5.0 cm in diameter in female 6/10/2022    Hypertension     Malignant neoplasm of head of pancreas 6/10/2022     History reviewed. No pertinent family history.  Social History     Socioeconomic History    Marital status:    Tobacco Use    Smoking status: Current Every Day Smoker     Packs/day: 1.00     Types: Cigarettes    Smokeless tobacco: Never Used   Substance and Sexual Activity    Alcohol use: Yes     Alcohol/week: 14.0 standard drinks     Types: 14 Cans of beer per week     Comment: socially    Drug use: No     Past Surgical History:   Procedure Laterality Date    CATARACT EXTRACTION  2021    ENDOSCOPIC ULTRASOUND OF UPPER GASTROINTESTINAL TRACT N/A 5/29/2022    Procedure: ULTRASOUND, UPPER GI TRACT, ENDOSCOPIC;  Surgeon: Pilar Marie MD;  Location: [facility];  Service: Endoscopy;  Laterality: N/A;    ERCP N/A 5/29/2022    Procedure: ERCP (ENDOSCOPIC RETROGRADE CHOLANGIOPANCREATOGRAPHY);  Surgeon: Pilar Marie MD;  Location: [facility] END[facility];  Service: Endoscopy;  Laterality: N/A;       Labs:  Lab Results   Component Value Date    WBC 9.22 06/10/2022    HGB 11.5 (L) 06/10/2022    HCT 34.7 (L) 06/10/2022    MCV 95 06/10/2022     (H) 06/10/2022     BMP  Lab Results   Component Value Date     (L) 05/30/2022    K 3.9 05/30/2022     05/30/2022    CO2 25 05/30/2022    BUN 9 05/30/2022    CREATININE 0.7 05/30/2022    CALCIUM 8.6 (L) 05/30/2022    ANIONGAP 10 05/30/2022    ESTGFRAFRICA >60 05/30/2022    EGFRNONAA >60 05/30/2022     Lab Results    Component Value Date     (H) 05/30/2022     (H) 05/30/2022    ALKPHOS 1,385 (H) 05/30/2022    BILITOT 9.1 (H) 05/30/2022       No results found for: IRON, TIBC, FERRITIN, SATURATEDIRO  No results found for: XLMEEYYH60  No results found for: FOLATE  No results found for: TSH      Review of Systems   Constitutional: Positive for activity change, appetite change, fatigue and unexpected weight change. Negative for chills, diaphoresis and fever.   HENT: Negative for congestion, dental problem, drooling, ear discharge, ear pain, facial swelling, hearing loss, mouth sores, nosebleeds, postnasal drip, rhinorrhea, sinus pressure, sneezing, sore throat, tinnitus, trouble swallowing and voice change.    Eyes: Negative for photophobia, pain, discharge, redness, itching and visual disturbance.   Respiratory: Negative for cough, choking, chest tightness, shortness of breath, wheezing and stridor.    Cardiovascular: Negative for chest pain, palpitations and leg swelling.   Gastrointestinal: Negative for abdominal distention, abdominal pain, anal bleeding, blood in stool, constipation, diarrhea, nausea, rectal pain and vomiting.   Endocrine: Negative for cold intolerance, heat intolerance, polydipsia, polyphagia and polyuria.   Genitourinary: Negative for decreased urine volume, difficulty urinating, dyspareunia, dysuria, enuresis, flank pain, frequency, genital sores, hematuria, menstrual problem, pelvic pain, urgency, vaginal bleeding, vaginal discharge and vaginal pain.   Musculoskeletal: Negative for arthralgias, back pain, gait problem, joint swelling, myalgias, neck pain and neck stiffness.   Skin: Negative for color change, pallor and rash.   Allergic/Immunologic: Negative for environmental allergies, food allergies and immunocompromised state.   Neurological: Positive for weakness. Negative for dizziness, tremors, seizures, syncope, facial asymmetry, speech difficulty, light-headedness, numbness and  headaches.   Hematological: Negative for adenopathy. Does not bruise/bleed easily.   Psychiatric/Behavioral: Positive for dysphoric mood. Negative for agitation, behavioral problems, confusion, decreased concentration, hallucinations, self-injury, sleep disturbance and suicidal ideas. The patient is nervous/anxious. The patient is not hyperactive.        Objective:      Physical Exam  Vitals reviewed.   Constitutional:       General: She is not in acute distress.     Appearance: She is well-developed. She is cachectic. She is ill-appearing. She is not diaphoretic.   HENT:      Head: Normocephalic and atraumatic.      Right Ear: External ear normal.      Left Ear: External ear normal.      Nose: Nose normal.      Right Sinus: No maxillary sinus tenderness or frontal sinus tenderness.      Left Sinus: No maxillary sinus tenderness or frontal sinus tenderness.      Mouth/Throat:      Pharynx: No oropharyngeal exudate.   Eyes:      General: Lids are normal. No scleral icterus.        Right eye: No discharge.         Left eye: No discharge.      Conjunctiva/sclera: Conjunctivae normal.      Right eye: Right conjunctiva is not injected. No hemorrhage.     Left eye: Left conjunctiva is not injected. No hemorrhage.     Pupils: Pupils are equal, round, and reactive to light.   Neck:      Thyroid: No thyromegaly.      Vascular: No JVD.      Trachea: No tracheal deviation.   Cardiovascular:      Rate and Rhythm: Normal rate.   Pulmonary:      Effort: Pulmonary effort is normal. No respiratory distress.      Breath sounds: No stridor.   Chest:      Chest wall: No tenderness.   Breasts:      Right: No supraclavicular adenopathy.      Left: No supraclavicular adenopathy.       Abdominal:      General: Bowel sounds are normal. There is no distension.      Palpations: Abdomen is soft. There is no hepatomegaly, splenomegaly or mass.      Tenderness: There is no abdominal tenderness. There is no rebound.   Musculoskeletal:          General: No tenderness. Normal range of motion.      Cervical back: Normal range of motion and neck supple.   Lymphadenopathy:      Cervical: No cervical adenopathy.      Upper Body:      Right upper body: No supraclavicular adenopathy.      Left upper body: No supraclavicular adenopathy.   Skin:     General: Skin is dry.      Findings: No erythema or rash.   Neurological:      Mental Status: She is alert and oriented to person, place, and time.      Cranial Nerves: No cranial nerve deficit.      Coordination: Coordination normal.   Psychiatric:         Behavior: Behavior normal.         Thought Content: Thought content normal.         Judgment: Judgment normal.             Assessment:      1. Malignant neoplasm of head of pancreas    2. Primary hypertension    3. Tobacco use disorder    4. Weight loss    5. Cachexia    6. Abdominal aortic aneurysm (AAA) greater than 5.0 cm in diameter in female           Plan:     Newly diagnosed pancreatic carcinoma.  At this point I would recommend that the patient proceed with MRI of the abdomen to determine if localized disease.  CT chest demonstrated no evidence of metastatic disease will ascertain if patient has localized disease and refer to surgical evaluation if localized.  High likelihood will not be able to undergo surgery has a very large abdominal aortic aneurysm she did not know about.  Recently seen.  Patient's family is concerned over possibility of genetic abnormality germ line testing ordered because of locally advanced pancreatic carcinoma.  At this time will proceed with follow-up after MRI for discussion of therapeutic options        Christian Kamara Jr, MD FACP

## 2022-06-13 ENCOUNTER — PATIENT MESSAGE (OUTPATIENT)
Dept: HEMATOLOGY/ONCOLOGY | Facility: CLINIC | Age: 82
End: 2022-06-13
Payer: MEDICARE

## 2022-06-13 ENCOUNTER — PATIENT MESSAGE (OUTPATIENT)
Dept: ENDOSCOPY | Facility: HOSPITAL | Age: 82
End: 2022-06-13
Payer: MEDICARE

## 2022-06-14 ENCOUNTER — OFFICE VISIT (OUTPATIENT)
Dept: GASTROENTEROLOGY | Facility: CLINIC | Age: 82
End: 2022-06-14
Payer: MEDICARE

## 2022-06-14 ENCOUNTER — PATIENT MESSAGE (OUTPATIENT)
Dept: HEMATOLOGY/ONCOLOGY | Facility: CLINIC | Age: 82
End: 2022-06-14
Payer: MEDICARE

## 2022-06-14 VITALS
OXYGEN SATURATION: 98 % | WEIGHT: 83.25 LBS | HEART RATE: 92 BPM | HEIGHT: 64 IN | DIASTOLIC BLOOD PRESSURE: 72 MMHG | BODY MASS INDEX: 14.21 KG/M2 | SYSTOLIC BLOOD PRESSURE: 144 MMHG

## 2022-06-14 DIAGNOSIS — C25.9 PANCREATIC ADENOCARCINOMA: Primary | ICD-10-CM

## 2022-06-14 PROCEDURE — 1126F PR PAIN SEVERITY QUANTIFIED, NO PAIN PRESENT: ICD-10-PCS | Mod: CPTII,S$GLB,, | Performed by: PHYSICIAN ASSISTANT

## 2022-06-14 PROCEDURE — 99213 PR OFFICE/OUTPT VISIT, EST, LEVL III, 20-29 MIN: ICD-10-PCS | Mod: S$GLB,,, | Performed by: PHYSICIAN ASSISTANT

## 2022-06-14 PROCEDURE — 1126F AMNT PAIN NOTED NONE PRSNT: CPT | Mod: CPTII,S$GLB,, | Performed by: PHYSICIAN ASSISTANT

## 2022-06-14 PROCEDURE — 1111F DSCHRG MED/CURRENT MED MERGE: CPT | Mod: CPTII,S$GLB,, | Performed by: PHYSICIAN ASSISTANT

## 2022-06-14 PROCEDURE — 3288F PR FALLS RISK ASSESSMENT DOCUMENTED: ICD-10-PCS | Mod: CPTII,S$GLB,, | Performed by: PHYSICIAN ASSISTANT

## 2022-06-14 PROCEDURE — 3078F DIAST BP <80 MM HG: CPT | Mod: CPTII,S$GLB,, | Performed by: PHYSICIAN ASSISTANT

## 2022-06-14 PROCEDURE — 1111F PR DISCHARGE MEDS RECONCILED W/ CURRENT OUTPATIENT MED LIST: ICD-10-PCS | Mod: CPTII,S$GLB,, | Performed by: PHYSICIAN ASSISTANT

## 2022-06-14 PROCEDURE — 3078F PR MOST RECENT DIASTOLIC BLOOD PRESSURE < 80 MM HG: ICD-10-PCS | Mod: CPTII,S$GLB,, | Performed by: PHYSICIAN ASSISTANT

## 2022-06-14 PROCEDURE — 99999 PR PBB SHADOW E&M-EST. PATIENT-LVL IV: ICD-10-PCS | Mod: PBBFAC,,, | Performed by: PHYSICIAN ASSISTANT

## 2022-06-14 PROCEDURE — 1159F MED LIST DOCD IN RCRD: CPT | Mod: CPTII,S$GLB,, | Performed by: PHYSICIAN ASSISTANT

## 2022-06-14 PROCEDURE — 1159F PR MEDICATION LIST DOCUMENTED IN MEDICAL RECORD: ICD-10-PCS | Mod: CPTII,S$GLB,, | Performed by: PHYSICIAN ASSISTANT

## 2022-06-14 PROCEDURE — 3288F FALL RISK ASSESSMENT DOCD: CPT | Mod: CPTII,S$GLB,, | Performed by: PHYSICIAN ASSISTANT

## 2022-06-14 PROCEDURE — 99213 OFFICE O/P EST LOW 20 MIN: CPT | Mod: S$GLB,,, | Performed by: PHYSICIAN ASSISTANT

## 2022-06-14 PROCEDURE — 1101F PR PT FALLS ASSESS DOC 0-1 FALLS W/OUT INJ PAST YR: ICD-10-PCS | Mod: CPTII,S$GLB,, | Performed by: PHYSICIAN ASSISTANT

## 2022-06-14 PROCEDURE — 1101F PT FALLS ASSESS-DOCD LE1/YR: CPT | Mod: CPTII,S$GLB,, | Performed by: PHYSICIAN ASSISTANT

## 2022-06-14 PROCEDURE — 3077F SYST BP >= 140 MM HG: CPT | Mod: CPTII,S$GLB,, | Performed by: PHYSICIAN ASSISTANT

## 2022-06-14 PROCEDURE — 3077F PR MOST RECENT SYSTOLIC BLOOD PRESSURE >= 140 MM HG: ICD-10-PCS | Mod: CPTII,S$GLB,, | Performed by: PHYSICIAN ASSISTANT

## 2022-06-14 PROCEDURE — 99999 PR PBB SHADOW E&M-EST. PATIENT-LVL IV: CPT | Mod: PBBFAC,,, | Performed by: PHYSICIAN ASSISTANT

## 2022-06-16 ENCOUNTER — TELEPHONE (OUTPATIENT)
Dept: HEMATOLOGY/ONCOLOGY | Facility: CLINIC | Age: 82
End: 2022-06-16
Payer: MEDICARE

## 2022-06-16 NOTE — TELEPHONE ENCOUNTER
----- Message from Roslyn Fountain sent at 6/16/2022  2:18 PM CDT -----  Contact: Daughter  States the autho for the pt MRI is needed,  no additional info given and can be reached at [phone]///thxMW

## 2022-06-16 NOTE — TELEPHONE ENCOUNTER
----- Message from Vladimir Decker sent at 6/16/2022 11:50 AM CDT -----  Contact: [id number]  Calling regarding pt MRI . Please call back at [phone] . Thanks/lillie

## 2022-06-20 ENCOUNTER — PATIENT MESSAGE (OUTPATIENT)
Dept: HEMATOLOGY/ONCOLOGY | Facility: CLINIC | Age: 82
End: 2022-06-20
Payer: MEDICARE

## 2022-06-21 ENCOUNTER — TELEPHONE (OUTPATIENT)
Dept: HEMATOLOGY/ONCOLOGY | Facility: CLINIC | Age: 82
End: 2022-06-21

## 2022-06-21 ENCOUNTER — PATIENT MESSAGE (OUTPATIENT)
Dept: HEMATOLOGY/ONCOLOGY | Facility: CLINIC | Age: 82
End: 2022-06-21

## 2022-06-21 ENCOUNTER — OFFICE VISIT (OUTPATIENT)
Dept: HEMATOLOGY/ONCOLOGY | Facility: CLINIC | Age: 82
End: 2022-06-21
Payer: MEDICARE

## 2022-06-21 VITALS
OXYGEN SATURATION: 99 % | BODY MASS INDEX: 13.85 KG/M2 | SYSTOLIC BLOOD PRESSURE: 156 MMHG | HEART RATE: 82 BPM | HEIGHT: 65 IN | TEMPERATURE: 98 F | WEIGHT: 83.13 LBS | DIASTOLIC BLOOD PRESSURE: 88 MMHG

## 2022-06-21 DIAGNOSIS — F17.200 TOBACCO USE DISORDER: ICD-10-CM

## 2022-06-21 DIAGNOSIS — C25.0 MALIGNANT NEOPLASM OF HEAD OF PANCREAS: Primary | ICD-10-CM

## 2022-06-21 DIAGNOSIS — Z12.31 ENCOUNTER FOR SCREENING MAMMOGRAM FOR MALIGNANT NEOPLASM OF BREAST: ICD-10-CM

## 2022-06-21 DIAGNOSIS — I71.40 ABDOMINAL AORTIC ANEURYSM (AAA) GREATER THAN 5.0 CM IN DIAMETER IN FEMALE: ICD-10-CM

## 2022-06-21 DIAGNOSIS — N63.10 BREAST MASS, RIGHT: ICD-10-CM

## 2022-06-21 PROCEDURE — 99215 OFFICE O/P EST HI 40 MIN: CPT | Mod: S$GLB,,, | Performed by: INTERNAL MEDICINE

## 2022-06-21 PROCEDURE — 3079F DIAST BP 80-89 MM HG: CPT | Mod: CPTII,S$GLB,, | Performed by: INTERNAL MEDICINE

## 2022-06-21 PROCEDURE — 1160F PR REVIEW ALL MEDS BY PRESCRIBER/CLIN PHARMACIST DOCUMENTED: ICD-10-PCS | Mod: CPTII,S$GLB,, | Performed by: INTERNAL MEDICINE

## 2022-06-21 PROCEDURE — 3077F SYST BP >= 140 MM HG: CPT | Mod: CPTII,S$GLB,, | Performed by: INTERNAL MEDICINE

## 2022-06-21 PROCEDURE — 1159F PR MEDICATION LIST DOCUMENTED IN MEDICAL RECORD: ICD-10-PCS | Mod: CPTII,S$GLB,, | Performed by: INTERNAL MEDICINE

## 2022-06-21 PROCEDURE — 3077F PR MOST RECENT SYSTOLIC BLOOD PRESSURE >= 140 MM HG: ICD-10-PCS | Mod: CPTII,S$GLB,, | Performed by: INTERNAL MEDICINE

## 2022-06-21 PROCEDURE — 1111F DSCHRG MED/CURRENT MED MERGE: CPT | Mod: CPTII,S$GLB,, | Performed by: INTERNAL MEDICINE

## 2022-06-21 PROCEDURE — 1126F PR PAIN SEVERITY QUANTIFIED, NO PAIN PRESENT: ICD-10-PCS | Mod: CPTII,S$GLB,, | Performed by: INTERNAL MEDICINE

## 2022-06-21 PROCEDURE — 1159F MED LIST DOCD IN RCRD: CPT | Mod: CPTII,S$GLB,, | Performed by: INTERNAL MEDICINE

## 2022-06-21 PROCEDURE — 1101F PR PT FALLS ASSESS DOC 0-1 FALLS W/OUT INJ PAST YR: ICD-10-PCS | Mod: CPTII,S$GLB,, | Performed by: INTERNAL MEDICINE

## 2022-06-21 PROCEDURE — 1101F PT FALLS ASSESS-DOCD LE1/YR: CPT | Mod: CPTII,S$GLB,, | Performed by: INTERNAL MEDICINE

## 2022-06-21 PROCEDURE — 99999 PR PBB SHADOW E&M-EST. PATIENT-LVL V: ICD-10-PCS | Mod: PBBFAC,,, | Performed by: INTERNAL MEDICINE

## 2022-06-21 PROCEDURE — 3288F FALL RISK ASSESSMENT DOCD: CPT | Mod: CPTII,S$GLB,, | Performed by: INTERNAL MEDICINE

## 2022-06-21 PROCEDURE — 3079F PR MOST RECENT DIASTOLIC BLOOD PRESSURE 80-89 MM HG: ICD-10-PCS | Mod: CPTII,S$GLB,, | Performed by: INTERNAL MEDICINE

## 2022-06-21 PROCEDURE — 99999 PR PBB SHADOW E&M-EST. PATIENT-LVL V: CPT | Mod: PBBFAC,,, | Performed by: INTERNAL MEDICINE

## 2022-06-21 PROCEDURE — 1111F PR DISCHARGE MEDS RECONCILED W/ CURRENT OUTPATIENT MED LIST: ICD-10-PCS | Mod: CPTII,S$GLB,, | Performed by: INTERNAL MEDICINE

## 2022-06-21 PROCEDURE — 3288F PR FALLS RISK ASSESSMENT DOCUMENTED: ICD-10-PCS | Mod: CPTII,S$GLB,, | Performed by: INTERNAL MEDICINE

## 2022-06-21 PROCEDURE — 99215 PR OFFICE/OUTPT VISIT, EST, LEVL V, 40-54 MIN: ICD-10-PCS | Mod: S$GLB,,, | Performed by: INTERNAL MEDICINE

## 2022-06-21 PROCEDURE — 1126F AMNT PAIN NOTED NONE PRSNT: CPT | Mod: CPTII,S$GLB,, | Performed by: INTERNAL MEDICINE

## 2022-06-21 PROCEDURE — 1160F RVW MEDS BY RX/DR IN RCRD: CPT | Mod: CPTII,S$GLB,, | Performed by: INTERNAL MEDICINE

## 2022-06-21 NOTE — TELEPHONE ENCOUNTER
Received phone call from Dr. Kamara to meet with pt in person during his visit and to assess her insurance coverage.  Pt has had to pay out of network co pays and DR Kamara is concerned to work pt up further if she is not in network for her financial concerns  Message sent to Marium Boswell in  office to help investigate insurance coverage prior to proceeding with work up for pancreatic cancer and new breast mass .   I will notify dr Kamara and pt daughter Kathy at [phone] as requested as soon as I am notified.     Received response from :  See below  [3:09 PM] Luz Brush, I have reviewed Ms. Correa insurance benefits and her primary medical facility [facility], so [facility] will be out of network for her.  It would be best if the patient contacted her insurance to find an in network facility because if could become very costly for the patient to remain here and seek treatment     Pt daughter and dR Kamara notified of above. Pt daughter to call insurance company and find in network facility and providers and call me back so that a referral/ records can be sent.

## 2022-06-21 NOTE — PROGRESS NOTES
Subjective:       Patient ID: Ann Marie Correa is a 82 y.o. female.    Chief Complaint: Results, Pancreatic Cancer, and Breast Cancer    HPI 82-year-old female returns patient had MRI done at outside facility at [facility] demonstrated no clear evidence of metastatic disease.  Patient was found to have incidental 2 cm mass involving her right breast.  Patient presents for review and discussion of options    Past Medical History:   Diagnosis Date    Abdominal aortic aneurysm (AAA) greater than 5.0 cm in diameter in female 6/10/2022    Hypertension     Malignant neoplasm of head of pancreas 6/10/2022     History reviewed. No pertinent family history.  Social History     Socioeconomic History    Marital status:    Tobacco Use    Smoking status: Current Every Day Smoker     Packs/day: 1.00     Types: Cigarettes    Smokeless tobacco: Never Used   Substance and Sexual Activity    Alcohol use: Yes     Alcohol/week: 14.0 standard drinks     Types: 14 Cans of beer per week     Comment: socially    Drug use: No     Past Surgical History:   Procedure Laterality Date    CATARACT EXTRACTION  2021    ENDOSCOPIC ULTRASOUND OF UPPER GASTROINTESTINAL TRACT N/A 5/29/2022    Procedure: ULTRASOUND, UPPER GI TRACT, ENDOSCOPIC;  Surgeon: Pilar Marie MD;  Location: [facility];  Service: Endoscopy;  Laterality: N/A;    ERCP N/A 5/29/2022    Procedure: ERCP (ENDOSCOPIC RETROGRADE CHOLANGIOPANCREATOGRAPHY);  Surgeon: Pilar Marie MD;  Location: [facility];  Service: Endoscopy;  Laterality: N/A;       Labs:  Lab Results   Component Value Date    WBC 9.22 06/10/2022    HGB 11.5 (L) 06/10/2022    HCT 34.7 (L) 06/10/2022    MCV 95 06/10/2022     (H) 06/10/2022     BMP  Lab Results   Component Value Date     06/10/2022    K 4.7 06/10/2022     06/10/2022    CO2 28 06/10/2022    BUN 19 06/10/2022    CREATININE 0.8 06/10/2022    CALCIUM 9.5 06/10/2022    ANIONGAP 9 06/10/2022     ESTGFRAFRICA >60 06/10/2022    EGFRNONAA >60 06/10/2022     Lab Results   Component Value Date     (H) 06/10/2022    AST 65 (H) 06/10/2022    ALKPHOS 498 (H) 06/10/2022    BILITOT 4.0 (H) 06/10/2022       No results found for: IRON, TIBC, FERRITIN, SATURATEDIRO  No results found for: [id number]  No results found for: FOLATE  No results found for: TSH      Review of Systems   Constitutional: Positive for fatigue. Negative for activity change, appetite change, chills, diaphoresis, fever and unexpected weight change.   HENT: Negative for congestion, dental problem, drooling, ear discharge, ear pain, facial swelling, hearing loss, mouth sores, nosebleeds, postnasal drip, rhinorrhea, sinus pressure, sneezing, sore throat, tinnitus, trouble swallowing and voice change.    Eyes: Negative for photophobia, pain, discharge, redness, itching and visual disturbance.   Respiratory: Negative for cough, choking, chest tightness, shortness of breath, wheezing and stridor.    Cardiovascular: Negative for chest pain, palpitations and leg swelling.   Gastrointestinal: Negative for abdominal distention, abdominal pain, anal bleeding, blood in stool, constipation, diarrhea, nausea, rectal pain and vomiting.   Endocrine: Negative for cold intolerance, heat intolerance, polydipsia, polyphagia and polyuria.   Genitourinary: Negative for decreased urine volume, difficulty urinating, dyspareunia, dysuria, enuresis, flank pain, frequency, genital sores, hematuria, menstrual problem, pelvic pain, urgency, vaginal bleeding, vaginal discharge and vaginal pain.   Musculoskeletal: Negative for arthralgias, back pain, gait problem, joint swelling, myalgias, neck pain and neck stiffness.   Skin: Negative for color change, pallor and rash.   Allergic/Immunologic: Negative for environmental allergies, food allergies and immunocompromised state.   Neurological: Positive for weakness. Negative for dizziness, tremors, seizures, syncope, facial  asymmetry, speech difficulty, light-headedness, numbness and headaches.   Hematological: Negative for adenopathy. Does not bruise/bleed easily.   Psychiatric/Behavioral: Positive for dysphoric mood. Negative for agitation, behavioral problems, confusion, decreased concentration, hallucinations, self-injury, sleep disturbance and suicidal ideas. The patient is nervous/anxious. The patient is not hyperactive.        Objective:      Physical Exam  Vitals reviewed.   Constitutional:       General: She is not in acute distress.     Appearance: She is well-developed. She is ill-appearing. She is not diaphoretic.   HENT:      Head: Normocephalic and atraumatic.      Right Ear: External ear normal.      Left Ear: External ear normal.      Nose: Nose normal.      Right Sinus: No maxillary sinus tenderness or frontal sinus tenderness.      Left Sinus: No maxillary sinus tenderness or frontal sinus tenderness.      Mouth/Throat:      Pharynx: No oropharyngeal exudate.   Eyes:      General: Lids are normal. No scleral icterus.        Right eye: No discharge.         Left eye: No discharge.      Conjunctiva/sclera: Conjunctivae normal.      Right eye: Right conjunctiva is not injected. No hemorrhage.     Left eye: Left conjunctiva is not injected. No hemorrhage.     Pupils: Pupils are equal, round, and reactive to light.   Neck:      Thyroid: No thyromegaly.      Vascular: No JVD.      Trachea: No tracheal deviation.   Cardiovascular:      Rate and Rhythm: Normal rate.   Pulmonary:      Effort: Pulmonary effort is normal. No respiratory distress.      Breath sounds: No stridor.   Chest:      Chest wall: No tenderness.   Breasts:      Right: No supraclavicular adenopathy.      Left: No supraclavicular adenopathy.       Abdominal:      General: Bowel sounds are normal. There is no distension.      Palpations: Abdomen is soft. There is no hepatomegaly, splenomegaly or mass.      Tenderness: There is no abdominal tenderness. There  is no rebound.   Musculoskeletal:         General: No tenderness. Normal range of motion.      Cervical back: Normal range of motion and neck supple.   Lymphadenopathy:      Cervical: No cervical adenopathy.      Upper Body:      Right upper body: No supraclavicular adenopathy.      Left upper body: No supraclavicular adenopathy.   Skin:     General: Skin is dry.      Findings: No erythema or rash.   Neurological:      Mental Status: She is alert and oriented to person, place, and time.      Cranial Nerves: No cranial nerve deficit.      Coordination: Coordination normal.   Psychiatric:         Behavior: Behavior normal.         Thought Content: Thought content normal.         Judgment: Judgment normal.             Assessment:      1. Malignant neoplasm of head of pancreas    2. Breast mass, right    3. Encounter for screening mammogram for malignant neoplasm of breast     4. Abdominal aortic aneurysm (AAA) greater than 5.0 cm in diameter in female    5. Tobacco use disorder           Plan:     The patient did not have her MRI I done at the that at the [facility] because of possible increasing copays.  I will have iron navigation team make sure that the patient is able to receive care here I would recommend that she be seen by Surgical Oncology to see whether not patient is a potential surgical candidate, would recommend mammogram and ultrasound of breast.  Awaiting results of germ line testing.  Also referral to surgery for MediPort placement the patient has multiple conditions at this time which is very distraught for her and her family.  But we will need to treat most serious 1st at being pancreatic carcinoma.  My recommendations would be once MediPort is placed to [facility] consider systemic therapy with FOLFIRINOX.  If patient is able to tolerate if not oxaliplatin gemcitabine.  We would like to characterize area in the breast biopsy wait for results of germ line testing to proceed with follow-up nurse  navigation team made aware total time 40 minutes.        Christian Kamara Jr, MD FACP

## 2022-06-22 ENCOUNTER — TELEPHONE (OUTPATIENT)
Dept: HEMATOLOGY/ONCOLOGY | Facility: CLINIC | Age: 82
End: 2022-06-22
Payer: MEDICARE

## 2022-06-22 NOTE — TELEPHONE ENCOUNTER
Pt daughter called in again stating that pt was getting an appt with Dr. Teddy Urena.  Records faxed to [phone]  See below messages :     ----- Message from Alyssa Moura RN sent at 6/22/2022  9:32 AM CDT -----  I have spoken to the pt daughter Kathy this am and the pt has chosen an oncology provider in her network.  The daughter will notify me of the appt once made and I can send whatever records we have for her over.   FYI  ----- Message -----  From: Christian Kamara MD  Sent: 6/21/2022   4:12 PM CDT  To: Alyssa Moura RN    She will need to decide  ----- Message -----  From: Alyssa Moura RN  Sent: 6/21/2022   3:25 PM CDT  To: MD Dr. Asad Anderson     I have heard back from , see below;    I have spoken to the daughter and she is contacting insurance co. to see who is in network and will let me know so we can send records thus far.      [3:09 PM] Luz Brush, I have reviewed Ms. Correa insurance benefits and her primary medical facility is [facility], so [facility] will be out of network for her.  It would be best if the patient contacted her insurance to find an in network facility because if could become very costly for the patient to remain here and seek treatment     Thanks  Alyssa    ----- Message -----  From: Christian Kamara MD  Sent: 6/21/2022  12:14 PM CDT  To: [facility]    The patient did not have her MRI I done at the that at the [facility] because of possible increasing copays.  I will have iron navigation team make sure that the patient is able to receive care here I would recommend that she be seen by Surgical Oncology to see whether not patient is a potential surgical candidate, would recommend mammogram and ultrasound of breast.  Awaiting results of germ line testing.  Also referral to surgery for MediPort placement the patient has multiple conditions at this time which is very distraught for her and her family.  But  we will need to treat most serious 1st at being pancreatic carcinoma.  My recommendations would be once MediPort is placed to [facility] consider systemic therapy with FOLFIRINOX.  If patient is able to tolerate if not oxaliplatin gemcitabine.  We would like to characterize area in the breast biopsy wait for results of germ line testing to proceed with follow-up nurse navigation team made aware total time 40 minutes.

## 2022-06-28 ENCOUNTER — TELEPHONE (OUTPATIENT)
Dept: HEMATOLOGY/ONCOLOGY | Facility: CLINIC | Age: 82
End: 2022-06-28
Payer: MEDICARE

## 2022-06-28 NOTE — TELEPHONE ENCOUNTER
Received call from Adithya Whitaker NP with genetics at [facility] [phone]  She is asking for genetic testing report on this pt.  I explained that the test was done 6/10/22 and the expected report date is 7/8/22  We have agreed that I will fax over the report to Dr Gibbons for pt care once report received  Fax # [phone]

## 2022-06-29 ENCOUNTER — TELEPHONE (OUTPATIENT)
Dept: HEMATOLOGY/ONCOLOGY | Facility: CLINIC | Age: 82
End: 2022-06-29
Payer: MEDICARE

## 2022-07-11 ENCOUNTER — PATIENT MESSAGE (OUTPATIENT)
Dept: HEMATOLOGY/ONCOLOGY | Facility: CLINIC | Age: 82
End: 2022-07-11
Payer: MEDICARE

## 2022-07-11 ENCOUNTER — TELEPHONE (OUTPATIENT)
Dept: HEMATOLOGY/ONCOLOGY | Facility: CLINIC | Age: 82
End: 2022-07-11
Payer: MEDICARE

## 2022-07-27 ENCOUNTER — TELEPHONE (OUTPATIENT)
Dept: GASTROENTEROLOGY | Facility: CLINIC | Age: 82
End: 2022-07-27
Payer: MEDICARE

## 2022-07-27 DIAGNOSIS — C25.9 PANCREATIC ADENOCARCINOMA: Primary | ICD-10-CM

## 2022-07-27 NOTE — TELEPHONE ENCOUNTER
----- Message from Pilar Marie MD sent at 7/27/2022  3:36 PM CDT -----  Order is in  ----- Message -----  From: Rose Rucker LPN  Sent: 7/26/2022   2:38 PM CDT  To: Pilar Marie MD    Can you put an order in?  Thanks!    ----- Message -----  From: Pilar Marie MD  Sent: 7/20/2022   4:26 PM CDT  To: Cynthia Quezada Staff    Please karinle her for ERCP with me in the nearest appointment, case request in.     -S

## 2022-07-27 NOTE — TELEPHONE ENCOUNTER
----- Message from Sara Matos sent at 7/27/2022  4:34 PM CDT -----  Patient would like a call back at [phone] in regards to her missed call.    Thanks

## 2022-08-02 ENCOUNTER — TELEPHONE (OUTPATIENT)
Dept: GASTROENTEROLOGY | Facility: CLINIC | Age: 82
End: 2022-08-02
Payer: MEDICARE

## 2022-08-02 NOTE — TELEPHONE ENCOUNTER
Phoned patient to schedule ERCP with Dr. Marie.  Spoke with her daughter Kita.  She stated that the patient has already had the procedure at [facility] with Dr. Lin.  She stated that the patient's insurance is out of network and she will not be using [facility].

## 2022-08-02 NOTE — TELEPHONE ENCOUNTER
----- Message from Rose Rucker LPN sent at 7/27/2022  4:29 PM CDT -----  Left voice mail for patient to return call to schedule.     ----- Message -----  From: Pilar Marie MD  Sent: 7/27/2022   3:36 PM CDT  To: Rose Rucker LPN    Order is in  ----- Message -----  From: Rose Rucker LPN  Sent: 7/26/2022   2:38 PM CDT  To: Pilar Marie MD    Can you put an order in?  Thanks!    ----- Message -----  From: Pilar Marie MD  Sent: 7/20/2022   4:26 PM CDT  To: Cynthia Quezada Staff    Please karinle her for ERCP with me in the nearest appointment, case request in.     -[facility]

## 2022-08-02 NOTE — TELEPHONE ENCOUNTER
----- Message from Pilar Marie MD sent at 7/20/2022  4:26 PM CDT -----  Please scheudle her for ERCP with me in the nearest appointment, case request in.     -NAKUL

## 2022-08-02 NOTE — TELEPHONE ENCOUNTER
Handled and documented on another message- patient unable to come to [facility] due to insurance being out of network.

## 2023-02-08 NOTE — ASSESSMENT & PLAN NOTE
Elevated  Resume home medication(s) - Toprol XL  Hydralazine prn     Non-Graft Cartilage Fenestration Text: The cartilage was fenestrated with a 2mm punch biopsy to help facilitate healing.

## 2023-02-22 NOTE — PROGRESS NOTES
Subjective:      Patient ID: Ann Marie Correa is a 82 y.o. female.    Chief Complaint: Hospital Follow Up    HPI  The patient has a history of pancreatic mass with obstructive jaundice. She was admitted to [facility] last month. During her admit, Dr. Marie performed an EUS and ERCP which showed a 4 cm stricture seen at the bile duct and abnormal pancreatic head mass seen. A plastic biliary stent was placed and biopsies obtained. He recommended a repeat ERCP in two months for stent exchange. (07/15/22)  In the meantime, pathology has come back showing adenocarcinoma of the pancreas. She saw Dr. Kamara last week and MRI recommended for staging.   She is accompanied by her daughter today. She wasn't able to make the appt with Dr. Kamara so she had several questions about the plan, imaging, AAA, etc. We discussed the procedure scheduled in July.  Her questions were answered, and she reported understanding.    The patient is feeling well. Appetite is good. No n/v or abdominal pain.     Review of Systems  As per HPI.     Objective:     Physical Exam  Constitutional:       Appearance: Normal appearance. She is well-developed.   HENT:      Head: Normocephalic and atraumatic.   Eyes:      Extraocular Movements: Extraocular movements intact.   Cardiovascular:      Rate and Rhythm: Normal rate and regular rhythm.   Pulmonary:      Effort: Pulmonary effort is normal. No respiratory distress.      Breath sounds: Normal breath sounds.   Abdominal:      General: Abdomen is flat. There is no distension.      Tenderness: There is no abdominal tenderness.   Skin:     General: Skin is dry.   Neurological:      Mental Status: She is alert and oriented to person, place, and time.      Gait: Gait normal.   Psychiatric:         Thought Content: Thought content normal.         Assessment:     1. Pancreatic adenocarcinoma        Plan:     This was primarily a counseling visit.   Keep appt for ERCP stent exchange as scheduled in July.  Instructions provided.  Keep appt with Dr. Kamara team as scheduled.        Follow up if symptoms worsen or fail to improve.    Thank you for the opportunity to participate in the care of this patient.   Betito Mooney PA-C.         Cibinqo Counseling: I discussed with the patient the risks of Cibinqo therapy including but not limited to common cold, nausea, headache, cold sores, increased blood CPK levels, dizziness, UTIs, fatigue, acne, and vomitting. Live vaccines should be avoided.  This medication has been linked to serious infections; higher rate of mortality; malignancy and lymphoproliferative disorders; major adverse cardiovascular events; thrombosis; thrombocytopenia and lymphopenia; lipid elevations; and retinal detachment.